# Patient Record
Sex: MALE | Race: WHITE | NOT HISPANIC OR LATINO | Employment: OTHER | URBAN - METROPOLITAN AREA
[De-identification: names, ages, dates, MRNs, and addresses within clinical notes are randomized per-mention and may not be internally consistent; named-entity substitution may affect disease eponyms.]

---

## 2021-11-28 ENCOUNTER — HOSPITAL ENCOUNTER (EMERGENCY)
Facility: HOSPITAL | Age: 66
Discharge: HOME | End: 2021-11-28
Attending: EMERGENCY MEDICINE
Payer: MEDICARE

## 2021-11-28 VITALS
TEMPERATURE: 98 F | RESPIRATION RATE: 18 BRPM | WEIGHT: 250 LBS | HEIGHT: 71 IN | SYSTOLIC BLOOD PRESSURE: 126 MMHG | DIASTOLIC BLOOD PRESSURE: 78 MMHG | HEART RATE: 74 BPM | BODY MASS INDEX: 35 KG/M2 | OXYGEN SATURATION: 98 %

## 2021-11-28 DIAGNOSIS — L03.115 CELLULITIS OF RIGHT LOWER EXTREMITY: ICD-10-CM

## 2021-11-28 DIAGNOSIS — I87.2 VENOUS STASIS DERMATITIS, UNSPECIFIED LATERALITY: Primary | ICD-10-CM

## 2021-11-28 LAB
ALBUMIN SERPL BCP-MCNC: 3.1 G/DL (ref 3.5–5.2)
ALP SERPL-CCNC: 55 U/L (ref 55–135)
ALT SERPL W/O P-5'-P-CCNC: 20 U/L (ref 10–44)
ANION GAP SERPL CALC-SCNC: 10 MMOL/L (ref 8–16)
AST SERPL-CCNC: 21 U/L (ref 10–40)
BASOPHILS # BLD AUTO: 0.01 K/UL (ref 0–0.2)
BASOPHILS NFR BLD: 0.2 % (ref 0–1.9)
BILIRUB SERPL-MCNC: 0.5 MG/DL (ref 0.1–1)
BUN SERPL-MCNC: 25 MG/DL (ref 8–23)
CALCIUM SERPL-MCNC: 8.5 MG/DL (ref 8.7–10.5)
CHLORIDE SERPL-SCNC: 105 MMOL/L (ref 95–110)
CO2 SERPL-SCNC: 26 MMOL/L (ref 23–29)
CREAT SERPL-MCNC: 1.4 MG/DL (ref 0.5–1.4)
DIFFERENTIAL METHOD: ABNORMAL
EOSINOPHIL # BLD AUTO: 0.1 K/UL (ref 0–0.5)
EOSINOPHIL NFR BLD: 1.1 % (ref 0–8)
ERYTHROCYTE [DISTWIDTH] IN BLOOD BY AUTOMATED COUNT: 12.9 % (ref 11.5–14.5)
EST. GFR  (AFRICAN AMERICAN): >60 ML/MIN/1.73 M^2
EST. GFR  (NON AFRICAN AMERICAN): 52 ML/MIN/1.73 M^2
GLUCOSE SERPL-MCNC: 149 MG/DL (ref 70–110)
HCT VFR BLD AUTO: 39.4 % (ref 40–54)
HGB BLD-MCNC: 12.7 G/DL (ref 14–18)
IMM GRANULOCYTES # BLD AUTO: 0.01 K/UL (ref 0–0.04)
IMM GRANULOCYTES NFR BLD AUTO: 0.2 % (ref 0–0.5)
LACTATE SERPL-SCNC: 1.4 MMOL/L (ref 0.5–2.2)
LYMPHOCYTES # BLD AUTO: 0.6 K/UL (ref 1–4.8)
LYMPHOCYTES NFR BLD: 11.9 % (ref 18–48)
MCH RBC QN AUTO: 29.8 PG (ref 27–31)
MCHC RBC AUTO-ENTMCNC: 32.2 G/DL (ref 32–36)
MCV RBC AUTO: 93 FL (ref 82–98)
MONOCYTES # BLD AUTO: 0.3 K/UL (ref 0.3–1)
MONOCYTES NFR BLD: 5.4 % (ref 4–15)
NEUTROPHILS # BLD AUTO: 4.4 K/UL (ref 1.8–7.7)
NEUTROPHILS NFR BLD: 81.2 % (ref 38–73)
NRBC BLD-RTO: 0 /100 WBC
PLATELET # BLD AUTO: 130 K/UL (ref 150–450)
PMV BLD AUTO: 9.6 FL (ref 9.2–12.9)
POTASSIUM SERPL-SCNC: 3.6 MMOL/L (ref 3.5–5.1)
PROT SERPL-MCNC: 6.5 G/DL (ref 6–8.4)
RBC # BLD AUTO: 4.26 M/UL (ref 4.6–6.2)
SODIUM SERPL-SCNC: 141 MMOL/L (ref 136–145)
WBC # BLD AUTO: 5.39 K/UL (ref 3.9–12.7)

## 2021-11-28 PROCEDURE — 87040 BLOOD CULTURE FOR BACTERIA: CPT | Mod: 59 | Performed by: EMERGENCY MEDICINE

## 2021-11-28 PROCEDURE — 25000003 PHARM REV CODE 250: Performed by: EMERGENCY MEDICINE

## 2021-11-28 PROCEDURE — 63600175 PHARM REV CODE 636 W HCPCS: Performed by: EMERGENCY MEDICINE

## 2021-11-28 PROCEDURE — 80053 COMPREHEN METABOLIC PANEL: CPT | Performed by: EMERGENCY MEDICINE

## 2021-11-28 PROCEDURE — 83605 ASSAY OF LACTIC ACID: CPT | Performed by: EMERGENCY MEDICINE

## 2021-11-28 PROCEDURE — 96365 THER/PROPH/DIAG IV INF INIT: CPT

## 2021-11-28 PROCEDURE — 99284 EMERGENCY DEPT VISIT MOD MDM: CPT | Mod: 25

## 2021-11-28 PROCEDURE — 85025 COMPLETE CBC W/AUTO DIFF WBC: CPT | Performed by: EMERGENCY MEDICINE

## 2021-11-28 RX ORDER — AMOXICILLIN AND CLAVULANATE POTASSIUM 875; 125 MG/1; MG/1
1 TABLET, FILM COATED ORAL 2 TIMES DAILY
Qty: 20 TABLET | Refills: 0 | Status: SHIPPED | OUTPATIENT
Start: 2021-11-28 | End: 2021-12-08

## 2021-11-28 RX ADMIN — PIPERACILLIN AND TAZOBACTAM 4.5 G: 4; .5 INJECTION, POWDER, LYOPHILIZED, FOR SOLUTION INTRAVENOUS; PARENTERAL at 11:11

## 2021-12-03 LAB
BACTERIA BLD CULT: NORMAL
BACTERIA BLD CULT: NORMAL

## 2022-01-19 ENCOUNTER — HOSPITAL ENCOUNTER (EMERGENCY)
Facility: HOSPITAL | Age: 67
Discharge: HOME | End: 2022-01-19
Attending: EMERGENCY MEDICINE
Payer: MEDICARE

## 2022-01-19 VITALS
SYSTOLIC BLOOD PRESSURE: 160 MMHG | TEMPERATURE: 100 F | WEIGHT: 232.25 LBS | BODY MASS INDEX: 32.52 KG/M2 | OXYGEN SATURATION: 97 % | HEIGHT: 71 IN | HEART RATE: 90 BPM | RESPIRATION RATE: 18 BRPM | DIASTOLIC BLOOD PRESSURE: 68 MMHG

## 2022-01-19 DIAGNOSIS — L03.115 CELLULITIS OF RIGHT LOWER LEG: Primary | ICD-10-CM

## 2022-01-19 LAB
ALBUMIN SERPL BCP-MCNC: 3.2 G/DL (ref 3.5–5.2)
ALP SERPL-CCNC: 64 U/L (ref 55–135)
ALT SERPL W/O P-5'-P-CCNC: 21 U/L (ref 10–44)
ANION GAP SERPL CALC-SCNC: 10 MMOL/L (ref 8–16)
AST SERPL-CCNC: 27 U/L (ref 10–40)
BACTERIA #/AREA URNS HPF: NORMAL /HPF
BASOPHILS # BLD AUTO: 0.02 K/UL (ref 0–0.2)
BASOPHILS NFR BLD: 0.3 % (ref 0–1.9)
BILIRUB SERPL-MCNC: 0.7 MG/DL (ref 0.1–1)
BILIRUB UR QL STRIP: NEGATIVE
BUN SERPL-MCNC: 28 MG/DL (ref 8–23)
CALCIUM SERPL-MCNC: 8.2 MG/DL (ref 8.7–10.5)
CHLORIDE SERPL-SCNC: 104 MMOL/L (ref 95–110)
CLARITY UR: CLEAR
CO2 SERPL-SCNC: 24 MMOL/L (ref 23–29)
COLOR UR: YELLOW
CREAT SERPL-MCNC: 1.8 MG/DL (ref 0.5–1.4)
CRP SERPL-MCNC: 338.2 MG/L (ref 0–8.2)
DIFFERENTIAL METHOD: ABNORMAL
EOSINOPHIL # BLD AUTO: 0 K/UL (ref 0–0.5)
EOSINOPHIL NFR BLD: 0.4 % (ref 0–8)
ERYTHROCYTE [DISTWIDTH] IN BLOOD BY AUTOMATED COUNT: 13.6 % (ref 11.5–14.5)
ERYTHROCYTE [SEDIMENTATION RATE] IN BLOOD BY WESTERGREN METHOD: 42 MM/HR (ref 0–10)
EST. GFR  (AFRICAN AMERICAN): 44 ML/MIN/1.73 M^2
EST. GFR  (NON AFRICAN AMERICAN): 38 ML/MIN/1.73 M^2
GLUCOSE SERPL-MCNC: 189 MG/DL (ref 70–110)
GLUCOSE UR QL STRIP: NEGATIVE
HCT VFR BLD AUTO: 39.6 % (ref 40–54)
HGB BLD-MCNC: 13 G/DL (ref 14–18)
HGB UR QL STRIP: NEGATIVE
HYALINE CASTS #/AREA URNS LPF: 0 /LPF
IMM GRANULOCYTES # BLD AUTO: 0.08 K/UL (ref 0–0.04)
IMM GRANULOCYTES NFR BLD AUTO: 1.2 % (ref 0–0.5)
KETONES UR QL STRIP: NEGATIVE
LACTATE SERPL-SCNC: 1.9 MMOL/L (ref 0.5–2.2)
LEUKOCYTE ESTERASE UR QL STRIP: NEGATIVE
LYMPHOCYTES # BLD AUTO: 0.2 K/UL (ref 1–4.8)
LYMPHOCYTES NFR BLD: 3.1 % (ref 18–48)
MCH RBC QN AUTO: 29.7 PG (ref 27–31)
MCHC RBC AUTO-ENTMCNC: 32.8 G/DL (ref 32–36)
MCV RBC AUTO: 91 FL (ref 82–98)
MICROSCOPIC COMMENT: NORMAL
MONOCYTES # BLD AUTO: 0.1 K/UL (ref 0.3–1)
MONOCYTES NFR BLD: 1.6 % (ref 4–15)
NEUTROPHILS # BLD AUTO: 6.2 K/UL (ref 1.8–7.7)
NEUTROPHILS NFR BLD: 93.4 % (ref 38–73)
NITRITE UR QL STRIP: NEGATIVE
NRBC BLD-RTO: 0 /100 WBC
PH UR STRIP: 6 [PH] (ref 5–8)
PLATELET # BLD AUTO: 94 K/UL (ref 150–450)
PMV BLD AUTO: 10.1 FL (ref 9.2–12.9)
POTASSIUM SERPL-SCNC: 3.6 MMOL/L (ref 3.5–5.1)
PROT SERPL-MCNC: 6.4 G/DL (ref 6–8.4)
PROT UR QL STRIP: ABNORMAL
RBC # BLD AUTO: 4.37 M/UL (ref 4.6–6.2)
RBC #/AREA URNS HPF: 0 /HPF (ref 0–4)
SARS-COV-2 RDRP RESP QL NAA+PROBE: NEGATIVE
SODIUM SERPL-SCNC: 138 MMOL/L (ref 136–145)
SP GR UR STRIP: >=1.03 (ref 1–1.03)
URN SPEC COLLECT METH UR: ABNORMAL
UROBILINOGEN UR STRIP-ACNC: 1 EU/DL
WBC # BLD AUTO: 6.67 K/UL (ref 3.9–12.7)
WBC #/AREA URNS HPF: 0 /HPF (ref 0–5)

## 2022-01-19 PROCEDURE — 86140 C-REACTIVE PROTEIN: CPT | Performed by: NURSE PRACTITIONER

## 2022-01-19 PROCEDURE — 83605 ASSAY OF LACTIC ACID: CPT | Performed by: NURSE PRACTITIONER

## 2022-01-19 PROCEDURE — 99283 EMERGENCY DEPT VISIT LOW MDM: CPT | Mod: 25

## 2022-01-19 PROCEDURE — 85651 RBC SED RATE NONAUTOMATED: CPT | Performed by: NURSE PRACTITIONER

## 2022-01-19 PROCEDURE — 87040 BLOOD CULTURE FOR BACTERIA: CPT | Mod: 59 | Performed by: NURSE PRACTITIONER

## 2022-01-19 PROCEDURE — 85025 COMPLETE CBC W/AUTO DIFF WBC: CPT | Performed by: NURSE PRACTITIONER

## 2022-01-19 PROCEDURE — U0002 COVID-19 LAB TEST NON-CDC: HCPCS | Performed by: NURSE PRACTITIONER

## 2022-01-19 PROCEDURE — 25000003 PHARM REV CODE 250: Performed by: EMERGENCY MEDICINE

## 2022-01-19 PROCEDURE — 81000 URINALYSIS NONAUTO W/SCOPE: CPT | Performed by: NURSE PRACTITIONER

## 2022-01-19 PROCEDURE — 80053 COMPREHEN METABOLIC PANEL: CPT | Performed by: NURSE PRACTITIONER

## 2022-01-19 RX ORDER — OMEPRAZOLE 40 MG/1
40 CAPSULE, DELAYED RELEASE ORAL DAILY
COMMUNITY
Start: 2021-09-29

## 2022-01-19 RX ORDER — IBUPROFEN 800 MG/1
800 TABLET ORAL
Status: COMPLETED | OUTPATIENT
Start: 2022-01-19 | End: 2022-01-19

## 2022-01-19 RX ORDER — TRAMADOL HYDROCHLORIDE 50 MG/1
50 TABLET ORAL EVERY 6 HOURS PRN
Qty: 12 TABLET | Refills: 0 | Status: SHIPPED | OUTPATIENT
Start: 2022-01-19 | End: 2022-01-23

## 2022-01-19 RX ORDER — ASPIRIN 81 MG/1
81 TABLET ORAL DAILY
COMMUNITY

## 2022-01-19 RX ORDER — ROSUVASTATIN CALCIUM 10 MG/1
10 TABLET, COATED ORAL NIGHTLY
COMMUNITY
Start: 2021-10-20

## 2022-01-19 RX ORDER — SULFAMETHOXAZOLE AND TRIMETHOPRIM 800; 160 MG/1; MG/1
1 TABLET ORAL 2 TIMES DAILY
Qty: 14 TABLET | Refills: 0 | Status: ON HOLD | OUTPATIENT
Start: 2022-01-19 | End: 2022-01-26 | Stop reason: HOSPADM

## 2022-01-19 RX ORDER — LOSARTAN POTASSIUM 100 MG/1
100 TABLET ORAL DAILY
COMMUNITY
Start: 2021-10-20

## 2022-01-19 RX ORDER — CLINDAMYCIN HYDROCHLORIDE 150 MG/1
300 CAPSULE ORAL
Status: COMPLETED | OUTPATIENT
Start: 2022-01-19 | End: 2022-01-19

## 2022-01-19 RX ORDER — AMLODIPINE BESYLATE 5 MG/1
5 TABLET ORAL DAILY
COMMUNITY
Start: 2021-10-20

## 2022-01-19 RX ORDER — HYDROCHLOROTHIAZIDE 25 MG/1
25 TABLET ORAL DAILY
COMMUNITY
Start: 2021-10-20

## 2022-01-19 RX ORDER — MELOXICAM 15 MG/1
15 TABLET ORAL DAILY
Status: ON HOLD | COMMUNITY
Start: 2021-10-20 | End: 2022-01-26 | Stop reason: HOSPADM

## 2022-01-19 RX ORDER — CLINDAMYCIN HYDROCHLORIDE 150 MG/1
300 CAPSULE ORAL 4 TIMES DAILY
Qty: 56 CAPSULE | Refills: 0 | Status: ON HOLD | OUTPATIENT
Start: 2022-01-19 | End: 2022-01-26 | Stop reason: HOSPADM

## 2022-01-19 RX ORDER — ACETAMINOPHEN 325 MG/1
650 TABLET ORAL
Status: DISCONTINUED | OUTPATIENT
Start: 2022-01-19 | End: 2022-01-19 | Stop reason: HOSPADM

## 2022-01-19 RX ORDER — SULFAMETHOXAZOLE AND TRIMETHOPRIM 800; 160 MG/1; MG/1
1 TABLET ORAL
Status: COMPLETED | OUTPATIENT
Start: 2022-01-19 | End: 2022-01-19

## 2022-01-19 RX ORDER — ASPIRIN 325 MG
1 TABLET, DELAYED RELEASE (ENTERIC COATED) ORAL
COMMUNITY
Start: 2022-01-04

## 2022-01-19 RX ORDER — FENOFIBRATE 160 MG/1
160 TABLET ORAL DAILY
COMMUNITY
Start: 2021-10-20

## 2022-01-19 RX ORDER — CARVEDILOL 25 MG/1
25 TABLET ORAL 2 TIMES DAILY
COMMUNITY
Start: 2021-10-20

## 2022-01-19 RX ORDER — POTASSIUM CHLORIDE 750 MG/1
10 CAPSULE, EXTENDED RELEASE ORAL DAILY
COMMUNITY
Start: 2021-10-20

## 2022-01-19 RX ADMIN — IBUPROFEN 800 MG: 800 TABLET, FILM COATED ORAL at 01:01

## 2022-01-19 RX ADMIN — CLINDAMYCIN HYDROCHLORIDE 300 MG: 150 CAPSULE ORAL at 01:01

## 2022-01-19 RX ADMIN — SULFAMETHOXAZOLE AND TRIMETHOPRIM 1 TABLET: 800; 160 TABLET ORAL at 01:01

## 2022-01-19 NOTE — DISCHARGE INSTRUCTIONS
Bactrim and clindamycin for infection.  Alternate Motrin Tylenol every 4 hours for fever and pain.  Ultram for breakthrough pain.  Follow up with her doctor 1-2 days for re-evaluation.  Return as needed if her redness spreads or for any worsening symptoms, problems, questions or concerns.

## 2022-01-19 NOTE — ED PROVIDER NOTES
SCRIBE #1 NOTE: I, Tommy James, am scribing for, and in the presence of, Rafiq Phan Jr., MD. I have scribed the entire note.      History      Chief Complaint   Patient presents with    Rt lower leg cellulitis     Redness and swelling to medial side of lower leg       Review of patient's allergies indicates:   Allergen Reactions    Prednisone         HPI   HPI    1/19/2022, 1:46 PM   History obtained from the patient      History of Present Illness: Lemuel Allen is a 66 y.o. male patient who presents to the Emergency Department for RLE pain, onset 1 week PTA. Pt states that he was dx with cellulitis 3 days ago and started on abx. Symptoms are constant and moderate in severity. No mitigating or exacerbating factors reported. Associated sxs include fever (Tnow 100.4) and RLE redness. Patient denies any chills, n/v/d, SOB, CP, weakness, numbness, dizziness, headache, and all other sxs at this time. No further complaints or concerns at this time.     Arrival mode: Personal vehicle    PCP: Michael Fitzpatrick MD       Past Medical History:  No past medical history on file.    Past Surgical History:  No past surgical history on file.      Family History:  No family history on file.    Social History:  Social History     Tobacco Use    Smoking status: Not on file    Smokeless tobacco: Not on file   Substance and Sexual Activity    Alcohol use: Not on file    Drug use: Not on file    Sexual activity: Not on file       ROS   Review of Systems   Constitutional: Positive for fever (Tnow 100.4). Negative for chills.   HENT: Negative for sore throat.    Respiratory: Negative for shortness of breath.    Cardiovascular: Negative for chest pain.   Gastrointestinal: Negative for diarrhea, nausea and vomiting.   Genitourinary: Negative for dysuria.   Musculoskeletal: Positive for myalgias (RLE). Negative for back pain.   Skin: Positive for color change (RLE redness). Negative for rash.   Neurological: Negative for dizziness,  "weakness, light-headedness, numbness and headaches.   Hematological: Does not bruise/bleed easily.   All other systems reviewed and are negative.    Physical Exam      Initial Vitals [01/19/22 1140]   BP Pulse Resp Temp SpO2   (!) 123/53 86 18 (!) 100.4 °F (38 °C) 97 %      MAP       --          Physical Exam  Nursing Notes and Vital Signs Reviewed.  Constitutional: Patient is in no acute distress. Well-developed and well-nourished.  Head: Atraumatic. Normocephalic.  Eyes:  EOM intact.  No scleral icterus.  ENT: Mucous membranes are moist.  Nares clear   Neck:  Full ROM. No JVD.  Cardiovascular: Regular rate. Regular rhythm No murmurs, rubs, or gallops. Distal pulses are 2+ and symmetric  Pulmonary/Chest: No respiratory distress. Clear to auscultation bilaterally. No wheezing or rales.  Equal chest wall rise bilaterally  Musculoskeletal: Moves all extremities. No obvious deformities.  5 x 5 strength in all extremities   Skin: Warm and dry.  Patient was cellulitis from the distal 3rd of the anterior lower leg extending proximally to just below the knee.  This covers the anterior leg or extensively laterally than medially.  It is not circumferential.  There is no abscess.  There is no fluctuance.  There is no crepitus.  There is no palpable cord her evidence of DVT.  Neurological:  Alert, awake, and appropriate.  Normal speech.  No acute focal neurological deficits are appreciated.  Two through 12 intact bilaterally.  Psychiatric: Normal affect. Good eye contact. Appropriate in content.    ED Course    Procedures  ED Vital Signs:  Vitals:    01/19/22 1140 01/19/22 1343   BP: (!) 123/53 (!) 174/75   Pulse: 86 90   Resp: 18 18   Temp: (!) 100.4 °F (38 °C) (!) 100.4 °F (38 °C)   TempSrc: Oral Oral   SpO2: 97%    Weight: 105.3 kg (232 lb 4.1 oz)    Height: 5' 11" (1.803 m)        Abnormal Lab Results:  Labs Reviewed   CBC W/ AUTO DIFFERENTIAL - Abnormal; Notable for the following components:       Result Value    RBC 4.37 " (*)     Hemoglobin 13.0 (*)     Hematocrit 39.6 (*)     Platelets 94 (*)     All other components within normal limits   COMPREHENSIVE METABOLIC PANEL - Abnormal; Notable for the following components:    Glucose 189 (*)     BUN 28 (*)     Creatinine 1.8 (*)     Calcium 8.2 (*)     Albumin 3.2 (*)     eGFR if  44 (*)     eGFR if non  38 (*)     All other components within normal limits   URINALYSIS, REFLEX TO URINE CULTURE - Abnormal; Notable for the following components:    Specific Gravity, UA >=1.030 (*)     Protein, UA 1+ (*)     All other components within normal limits    Narrative:     Specimen Source->Urine   C-REACTIVE PROTEIN - Abnormal; Notable for the following components:    .2 (*)     All other components within normal limits   CULTURE, BLOOD   CULTURE, BLOOD   LACTIC ACID, PLASMA   SARS-COV-2 RNA AMPLIFICATION, QUAL   URINALYSIS MICROSCOPIC    Narrative:     Specimen Source->Urine   LACTIC ACID, PLASMA   SEDIMENTATION RATE        All Lab Results:  Results for orders placed or performed during the hospital encounter of 01/19/22   CBC auto differential   Result Value Ref Range    WBC 6.67 3.90 - 12.70 K/uL    RBC 4.37 (L) 4.60 - 6.20 M/uL    Hemoglobin 13.0 (L) 14.0 - 18.0 g/dL    Hematocrit 39.6 (L) 40.0 - 54.0 %    MCV 91 82 - 98 fL    MCH 29.7 27.0 - 31.0 pg    MCHC 32.8 32.0 - 36.0 g/dL    RDW 13.6 11.5 - 14.5 %    Platelets 94 (L) 150 - 450 K/uL    MPV 10.1 9.2 - 12.9 fL   Comprehensive metabolic panel   Result Value Ref Range    Sodium 138 136 - 145 mmol/L    Potassium 3.6 3.5 - 5.1 mmol/L    Chloride 104 95 - 110 mmol/L    CO2 24 23 - 29 mmol/L    Glucose 189 (H) 70 - 110 mg/dL    BUN 28 (H) 8 - 23 mg/dL    Creatinine 1.8 (H) 0.5 - 1.4 mg/dL    Calcium 8.2 (L) 8.7 - 10.5 mg/dL    Total Protein 6.4 6.0 - 8.4 g/dL    Albumin 3.2 (L) 3.5 - 5.2 g/dL    Total Bilirubin 0.7 0.1 - 1.0 mg/dL    Alkaline Phosphatase 64 55 - 135 U/L    AST 27 10 - 40 U/L    ALT 21 10 -  44 U/L    Anion Gap 10 8 - 16 mmol/L    eGFR if African American 44 (A) >60 mL/min/1.73 m^2    eGFR if non African American 38 (A) >60 mL/min/1.73 m^2   Lactic acid, plasma #1   Result Value Ref Range    Lactate (Lactic Acid) 1.9 0.5 - 2.2 mmol/L   Urinalysis, Reflex to Urine Culture Urine, Clean Catch    Specimen: Urine   Result Value Ref Range    Specimen UA Urine, Clean Catch     Color, UA Yellow Yellow, Straw, Zuly    Appearance, UA Clear Clear    pH, UA 6.0 5.0 - 8.0    Specific Gravity, UA >=1.030 (A) 1.005 - 1.030    Protein, UA 1+ (A) Negative    Glucose, UA Negative Negative    Ketones, UA Negative Negative    Bilirubin (UA) Negative Negative    Occult Blood UA Negative Negative    Nitrite, UA Negative Negative    Urobilinogen, UA 1.0 <2.0 EU/dL    Leukocytes, UA Negative Negative   C-reactive protein   Result Value Ref Range    .2 (H) 0.0 - 8.2 mg/L   COVID-19 Rapid Screening   Result Value Ref Range    SARS-CoV-2 RNA, Amplification, Qual Negative Negative   Urinalysis Microscopic   Result Value Ref Range    RBC, UA 0 0 - 4 /hpf    WBC, UA 0 0 - 5 /hpf    Bacteria None None-Occ /hpf    Hyaline Casts, UA 0 0-1/lpf /lpf    Microscopic Comment SEE COMMENT      Imaging Results:  Imaging Results          X-Ray Chest AP Portable (Final result)  Result time 01/19/22 13:08:38    Final result by JORJE Lujan Sr., MD (01/19/22 13:08:38)                 Impression:      There is no evidence of an acute pulmonary process.  .      Electronically signed by: Juno Lujan MD  Date:    01/19/2022  Time:    13:08             Narrative:    EXAMINATION:  XR CHEST AP PORTABLE    CLINICAL HISTORY:  Sepsis;    COMPARISON:  04/13/2011    FINDINGS:  The size of the heart is normal.  There is no evidence of an acute pulmonary process.  There is no pneumothorax.  The costophrenic angles are sharp.                                        The Emergency Provider reviewed the vital signs and test results, which are  outlined above.    ED Discussion     1:49 PM: Reassessed pt at this time. Discussed with pt all pertinent ED information and results. Discussed pt dx and plan of tx. Gave pt all f/u and return to the ED instructions. All questions and concerns were addressed at this time. Pt expresses understanding of information and instructions, and is comfortable with plan to discharge. Pt is stable for discharge.    I discussed with patient and/or family/caretaker that evaluation in the ED does not suggest any emergent or life threatening medical conditions requiring immediate intervention beyond what was provided in the ED, and I believe patient is safe for discharge.  Regardless, an unremarkable evaluation in the ED does not preclude the development or presence of a serious of life threatening condition. As such, patient was instructed to return immediately for any worsening or change in current symptoms.       Patient is stable nontoxic.  Has low-grade temperature with normal white count.  He has a history of cellulitis in the leg which responds very well oral antibiotics and he is comfortable treating this at home.  He is not toxic and is not circumferential.  There is no abscess to obtain source control.  Patient does have good follow-up.  Discussed with him at length his diagnosis and will double cover staph with clinda and Bactrim due to his fever.  I have also provided pain control I have advised close follow-up tomorrow or the following day with primary care provider.  He is return if his redness worsens as fever worsens or for any problems questions or concerns.  He is very reliable and verbalized agreement understanding with all instructions    ED Medication(s):  Medications   acetaminophen tablet 650 mg (has no administration in time range)   clindamycin capsule 300 mg (300 mg Oral Given 1/19/22 3019)   sulfamethoxazole-trimethoprim 800-160mg per tablet 1 tablet (1 tablet Oral Given 1/19/22 7275)   ibuprofen tablet  800 mg (800 mg Oral Given 1/19/22 0516)     New Prescriptions    CLINDAMYCIN (CLEOCIN) 150 MG CAPSULE    Take 2 capsules (300 mg total) by mouth 4 (four) times daily. for 7 days    SULFAMETHOXAZOLE-TRIMETHOPRIM 800-160MG (BACTRIM DS) 800-160 MG TAB    Take 1 tablet by mouth 2 (two) times daily. for 7 days    TRAMADOL (ULTRAM) 50 MG TABLET    Take 1 tablet (50 mg total) by mouth every 6 (six) hours as needed for Pain.           Medical Decision Making    Medical Decision Making:   Clinical Tests:   Lab Tests: Ordered and Reviewed  Radiological Study: Ordered and Reviewed           Scribe Attestation:   Scribe #1: I performed the above scribed service and the documentation accurately describes the services I performed. I attest to the accuracy of the note.    Attending:   Physician Attestation Statement for Scribe #1: I, Rafiq Phan Jr., MD, personally performed the services described in this documentation, as scribed by Tommy Ramirez, in my presence, and it is both accurate and complete.          Clinical Impression       ICD-10-CM ICD-9-CM   1. Cellulitis of right lower leg  L03.115 682.6       Disposition:   Disposition: Discharged  Condition: Stable         Rafiq Phan Jr., MD  01/19/22 1401

## 2022-01-19 NOTE — FIRST PROVIDER EVALUATION
"Medical screening exam completed.  I have conducted a focused provider triage encounter, findings are as follows:    Brief history of present illness:  Cellulitis to lower ext. Hx of treatment with iv antibiotics     Vitals:    01/19/22 1140   BP: (!) 123/53   BP Location: Right arm   Patient Position: Sitting   Pulse: 86   Resp: 18   Temp: (!) 100.4 °F (38 °C)   TempSrc: Oral   SpO2: 97%   Weight: 105.3 kg (232 lb 4.1 oz)   Height: 5' 11" (1.803 m)         Preliminary workup initiated; this workup will be continued and followed by the physician or advanced practice provider that is assigned to the patient when roomed.  "

## 2022-01-23 ENCOUNTER — HOSPITAL ENCOUNTER (INPATIENT)
Facility: HOSPITAL | Age: 67
LOS: 3 days | Discharge: HOME HEALTH | DRG: 683 | End: 2022-01-26
Attending: EMERGENCY MEDICINE | Admitting: INTERNAL MEDICINE
Payer: MEDICARE

## 2022-01-23 DIAGNOSIS — N18.9 ACUTE RENAL FAILURE SUPERIMPOSED ON CHRONIC KIDNEY DISEASE, UNSPECIFIED CKD STAGE, UNSPECIFIED ACUTE RENAL FAILURE TYPE: ICD-10-CM

## 2022-01-23 DIAGNOSIS — R07.9 CHEST PAIN: ICD-10-CM

## 2022-01-23 DIAGNOSIS — R06.02 SHORTNESS OF BREATH: ICD-10-CM

## 2022-01-23 DIAGNOSIS — L03.115 CELLULITIS OF RIGHT LOWER EXTREMITY: Primary | ICD-10-CM

## 2022-01-23 DIAGNOSIS — R60.9 SWELLING: ICD-10-CM

## 2022-01-23 DIAGNOSIS — N17.9 ACUTE RENAL FAILURE SUPERIMPOSED ON CHRONIC KIDNEY DISEASE, UNSPECIFIED CKD STAGE, UNSPECIFIED ACUTE RENAL FAILURE TYPE: ICD-10-CM

## 2022-01-23 PROBLEM — I10 HYPERTENSION: Status: ACTIVE | Noted: 2022-01-23

## 2022-01-23 PROBLEM — E78.5 HYPERLIPIDEMIA: Status: ACTIVE | Noted: 2022-01-23

## 2022-01-23 LAB
ALBUMIN SERPL BCP-MCNC: 2.5 G/DL (ref 3.5–5.2)
ALP SERPL-CCNC: 220 U/L (ref 55–135)
ALT SERPL W/O P-5'-P-CCNC: 33 U/L (ref 10–44)
ANION GAP SERPL CALC-SCNC: 12 MMOL/L (ref 8–16)
ANION GAP SERPL CALC-SCNC: 12 MMOL/L (ref 8–16)
AORTIC ROOT ANNULUS: 3.93 CM
ASCENDING AORTA: 3.34 CM
AST SERPL-CCNC: 47 U/L (ref 10–40)
AV INDEX (PROSTH): 0.71
AV MEAN GRADIENT: 5 MMHG
AV PEAK GRADIENT: 8 MMHG
AV VALVE AREA: 2.93 CM2
AV VELOCITY RATIO: 0.67
BASOPHILS # BLD AUTO: 0.02 K/UL (ref 0–0.2)
BASOPHILS NFR BLD: 0.2 % (ref 0–1.9)
BILIRUB SERPL-MCNC: 0.5 MG/DL (ref 0.1–1)
BILIRUB UR QL STRIP: NEGATIVE
BNP SERPL-MCNC: 527 PG/ML (ref 0–99)
BSA FOR ECHO PROCEDURE: 2.33 M2
BUN SERPL-MCNC: 64 MG/DL (ref 8–23)
BUN SERPL-MCNC: 72 MG/DL (ref 8–23)
CALCIUM SERPL-MCNC: 7.9 MG/DL (ref 8.7–10.5)
CALCIUM SERPL-MCNC: 8.8 MG/DL (ref 8.7–10.5)
CHLORIDE SERPL-SCNC: 100 MMOL/L (ref 95–110)
CHLORIDE SERPL-SCNC: 105 MMOL/L (ref 95–110)
CLARITY UR: CLEAR
CO2 SERPL-SCNC: 20 MMOL/L (ref 23–29)
CO2 SERPL-SCNC: 22 MMOL/L (ref 23–29)
COLOR UR: YELLOW
CREAT SERPL-MCNC: 2.8 MG/DL (ref 0.5–1.4)
CREAT SERPL-MCNC: 3.4 MG/DL (ref 0.5–1.4)
CRP SERPL-MCNC: 259.7 MG/L (ref 0–8.2)
CV ECHO LV RWT: 1.23 CM
DIFFERENTIAL METHOD: ABNORMAL
DOP CALC AO PEAK VEL: 1.41 M/S
DOP CALC AO VTI: 29.2 CM
DOP CALC LVOT AREA: 4.1 CM2
DOP CALC LVOT DIAMETER: 2.29 CM
DOP CALC LVOT PEAK VEL: 0.95 M/S
DOP CALC LVOT STROKE VOLUME: 85.63 CM3
DOP CALC RVOT PEAK VEL: 1.01 M/S
DOP CALC RVOT VTI: 25.2 CM
DOP CALCLVOT PEAK VEL VTI: 20.8 CM
E WAVE DECELERATION TIME: 187.58 MSEC
E/A RATIO: 1.89
E/E' RATIO: 7.25 M/S
ECHO EF ESTIMATED: 57 %
ECHO LV POSTERIOR WALL: 1.91 CM (ref 0.6–1.1)
EJECTION FRACTION: 65 %
EOSINOPHIL # BLD AUTO: 0.2 K/UL (ref 0–0.5)
EOSINOPHIL NFR BLD: 1.8 % (ref 0–8)
ERYTHROCYTE [DISTWIDTH] IN BLOOD BY AUTOMATED COUNT: 14.8 % (ref 11.5–14.5)
ERYTHROCYTE [SEDIMENTATION RATE] IN BLOOD BY WESTERGREN METHOD: 117 MM/HR (ref 0–10)
EST. GFR  (AFRICAN AMERICAN): 21 ML/MIN/1.73 M^2
EST. GFR  (AFRICAN AMERICAN): 26 ML/MIN/1.73 M^2
EST. GFR  (NON AFRICAN AMERICAN): 18 ML/MIN/1.73 M^2
EST. GFR  (NON AFRICAN AMERICAN): 22 ML/MIN/1.73 M^2
FERRITIN SERPL-MCNC: 815 NG/ML (ref 20–300)
FRACTIONAL SHORTENING: 29 % (ref 28–44)
GLUCOSE SERPL-MCNC: 104 MG/DL (ref 70–110)
GLUCOSE SERPL-MCNC: 108 MG/DL (ref 70–110)
GLUCOSE UR QL STRIP: NEGATIVE
HCT VFR BLD AUTO: 31.5 % (ref 40–54)
HGB BLD-MCNC: 10.2 G/DL (ref 14–18)
HGB UR QL STRIP: NEGATIVE
IMM GRANULOCYTES # BLD AUTO: 0.13 K/UL (ref 0–0.04)
IMM GRANULOCYTES NFR BLD AUTO: 1.5 % (ref 0–0.5)
INTERVENTRICULAR SEPTUM: 1.91 CM (ref 0.6–1.1)
IVC DIAMETER: 2.18 CM
IVRT: 34.25 MSEC
KETONES UR QL STRIP: NEGATIVE
LA MAJOR: 4.66 CM
LA MINOR: 3.41 CM
LA WIDTH: 3.44 CM
LACTATE SERPL-SCNC: 0.7 MMOL/L (ref 0.5–2.2)
LDH SERPL L TO P-CCNC: 349 U/L (ref 110–260)
LEFT ATRIUM SIZE: 3 CM
LEFT ATRIUM VOLUME INDEX: 15.2 ML/M2
LEFT ATRIUM VOLUME: 34.55 CM3
LEFT INTERNAL DIMENSION IN SYSTOLE: 2.21 CM (ref 2.1–4)
LEFT VENTRICLE DIASTOLIC VOLUME INDEX: 16.88 ML/M2
LEFT VENTRICLE DIASTOLIC VOLUME: 38.32 ML
LEFT VENTRICLE MASS INDEX: 111 G/M2
LEFT VENTRICLE SYSTOLIC VOLUME INDEX: 7.2 ML/M2
LEFT VENTRICLE SYSTOLIC VOLUME: 16.32 ML
LEFT VENTRICULAR INTERNAL DIMENSION IN DIASTOLE: 3.11 CM (ref 3.5–6)
LEFT VENTRICULAR MASS: 252.47 G
LEUKOCYTE ESTERASE UR QL STRIP: NEGATIVE
LV LATERAL E/E' RATIO: 7.25 M/S
LV SEPTAL E/E' RATIO: 7.25 M/S
LVOT MG: 2.15 MMHG
LVOT MV: 0.71 CM/S
LYMPHOCYTES # BLD AUTO: 0.5 K/UL (ref 1–4.8)
LYMPHOCYTES NFR BLD: 6 % (ref 18–48)
MCH RBC QN AUTO: 29.4 PG (ref 27–31)
MCHC RBC AUTO-ENTMCNC: 32.4 G/DL (ref 32–36)
MCV RBC AUTO: 91 FL (ref 82–98)
MONOCYTES # BLD AUTO: 0.3 K/UL (ref 0.3–1)
MONOCYTES NFR BLD: 3.8 % (ref 4–15)
MV PEAK A VEL: 0.46 M/S
MV PEAK E VEL: 0.87 M/S
MV STENOSIS PRESSURE HALF TIME: 54.4 MS
MV VALVE AREA P 1/2 METHOD: 4.04 CM2
NEUTROPHILS # BLD AUTO: 7.5 K/UL (ref 1.8–7.7)
NEUTROPHILS NFR BLD: 86.7 % (ref 38–73)
NITRITE UR QL STRIP: NEGATIVE
NRBC BLD-RTO: 0 /100 WBC
PH UR STRIP: 6 [PH] (ref 5–8)
PISA TR MAX VEL: 3.41 M/S
PLATELET # BLD AUTO: 140 K/UL (ref 150–450)
PLATELET BLD QL SMEAR: ABNORMAL
PMV BLD AUTO: 10.7 FL (ref 9.2–12.9)
POTASSIUM SERPL-SCNC: 3.8 MMOL/L (ref 3.5–5.1)
POTASSIUM SERPL-SCNC: 4.3 MMOL/L (ref 3.5–5.1)
PROCALCITONIN SERPL IA-MCNC: 1 NG/ML
PROT SERPL-MCNC: 6.8 G/DL (ref 6–8.4)
PROT UR QL STRIP: NEGATIVE
PV MEAN GRADIENT: 2.99 MMHG
RA MAJOR: 4.19 CM
RA WIDTH: 3.51 CM
RBC # BLD AUTO: 3.47 M/UL (ref 4.6–6.2)
SARS-COV-2 RDRP RESP QL NAA+PROBE: NEGATIVE
SINUS: 3.46 CM
SODIUM SERPL-SCNC: 134 MMOL/L (ref 136–145)
SODIUM SERPL-SCNC: 137 MMOL/L (ref 136–145)
SODIUM UR-SCNC: 21 MMOL/L (ref 20–250)
SP GR UR STRIP: 1.02 (ref 1–1.03)
STJ: 3.42 CM
TDI LATERAL: 0.12 M/S
TDI SEPTAL: 0.12 M/S
TDI: 0.12 M/S
TR MAX PG: 47 MMHG
TRICUSPID ANNULAR PLANE SYSTOLIC EXCURSION: 2.27 CM
TROPONIN I SERPL DL<=0.01 NG/ML-MCNC: 0.01 NG/ML (ref 0–0.03)
URN SPEC COLLECT METH UR: NORMAL
UROBILINOGEN UR STRIP-ACNC: NEGATIVE EU/DL
WBC # BLD AUTO: 8.7 K/UL (ref 3.9–12.7)

## 2022-01-23 PROCEDURE — 63600175 PHARM REV CODE 636 W HCPCS: Performed by: NURSE PRACTITIONER

## 2022-01-23 PROCEDURE — 93005 ELECTROCARDIOGRAM TRACING: CPT

## 2022-01-23 PROCEDURE — 82728 ASSAY OF FERRITIN: CPT | Performed by: EMERGENCY MEDICINE

## 2022-01-23 PROCEDURE — 36415 COLL VENOUS BLD VENIPUNCTURE: CPT | Performed by: EMERGENCY MEDICINE

## 2022-01-23 PROCEDURE — 93010 ELECTROCARDIOGRAM REPORT: CPT | Mod: ,,, | Performed by: STUDENT IN AN ORGANIZED HEALTH CARE EDUCATION/TRAINING PROGRAM

## 2022-01-23 PROCEDURE — 85025 COMPLETE CBC W/AUTO DIFF WBC: CPT | Performed by: EMERGENCY MEDICINE

## 2022-01-23 PROCEDURE — 25000003 PHARM REV CODE 250: Performed by: INTERNAL MEDICINE

## 2022-01-23 PROCEDURE — 83605 ASSAY OF LACTIC ACID: CPT | Performed by: EMERGENCY MEDICINE

## 2022-01-23 PROCEDURE — 20000000 HC ICU ROOM

## 2022-01-23 PROCEDURE — 86140 C-REACTIVE PROTEIN: CPT | Performed by: EMERGENCY MEDICINE

## 2022-01-23 PROCEDURE — 99900035 HC TECH TIME PER 15 MIN (STAT)

## 2022-01-23 PROCEDURE — 25000003 PHARM REV CODE 250: Performed by: NURSE PRACTITIONER

## 2022-01-23 PROCEDURE — 84145 PROCALCITONIN (PCT): CPT | Performed by: EMERGENCY MEDICINE

## 2022-01-23 PROCEDURE — U0002 COVID-19 LAB TEST NON-CDC: HCPCS | Performed by: EMERGENCY MEDICINE

## 2022-01-23 PROCEDURE — 27000207 HC ISOLATION

## 2022-01-23 PROCEDURE — U0003 INFECTIOUS AGENT DETECTION BY NUCLEIC ACID (DNA OR RNA); SEVERE ACUTE RESPIRATORY SYNDROME CORONAVIRUS 2 (SARS-COV-2) (CORONAVIRUS DISEASE [COVID-19]), AMPLIFIED PROBE TECHNIQUE, MAKING USE OF HIGH THROUGHPUT TECHNOLOGIES AS DESCRIBED BY CMS-2020-01-R: HCPCS | Performed by: NURSE PRACTITIONER

## 2022-01-23 PROCEDURE — 80048 BASIC METABOLIC PNL TOTAL CA: CPT | Performed by: NURSE PRACTITIONER

## 2022-01-23 PROCEDURE — 96375 TX/PRO/DX INJ NEW DRUG ADDON: CPT

## 2022-01-23 PROCEDURE — 81003 URINALYSIS AUTO W/O SCOPE: CPT | Performed by: NURSE PRACTITIONER

## 2022-01-23 PROCEDURE — 83615 LACTATE (LD) (LDH) ENZYME: CPT | Performed by: EMERGENCY MEDICINE

## 2022-01-23 PROCEDURE — 83880 ASSAY OF NATRIURETIC PEPTIDE: CPT | Performed by: EMERGENCY MEDICINE

## 2022-01-23 PROCEDURE — 99291 CRITICAL CARE FIRST HOUR: CPT | Mod: 25

## 2022-01-23 PROCEDURE — 25000242 PHARM REV CODE 250 ALT 637 W/ HCPCS: Performed by: INTERNAL MEDICINE

## 2022-01-23 PROCEDURE — 63600175 PHARM REV CODE 636 W HCPCS: Performed by: EMERGENCY MEDICINE

## 2022-01-23 PROCEDURE — 25000003 PHARM REV CODE 250: Performed by: EMERGENCY MEDICINE

## 2022-01-23 PROCEDURE — 85651 RBC SED RATE NONAUTOMATED: CPT | Performed by: EMERGENCY MEDICINE

## 2022-01-23 PROCEDURE — 96365 THER/PROPH/DIAG IV INF INIT: CPT

## 2022-01-23 PROCEDURE — 84484 ASSAY OF TROPONIN QUANT: CPT | Performed by: EMERGENCY MEDICINE

## 2022-01-23 PROCEDURE — U0005 INFEC AGEN DETEC AMPLI PROBE: HCPCS | Performed by: NURSE PRACTITIONER

## 2022-01-23 PROCEDURE — 93010 EKG 12-LEAD: ICD-10-PCS | Mod: ,,, | Performed by: STUDENT IN AN ORGANIZED HEALTH CARE EDUCATION/TRAINING PROGRAM

## 2022-01-23 PROCEDURE — 80053 COMPREHEN METABOLIC PANEL: CPT | Performed by: EMERGENCY MEDICINE

## 2022-01-23 PROCEDURE — 84300 ASSAY OF URINE SODIUM: CPT | Performed by: NURSE PRACTITIONER

## 2022-01-23 PROCEDURE — 87040 BLOOD CULTURE FOR BACTERIA: CPT | Mod: 59 | Performed by: EMERGENCY MEDICINE

## 2022-01-23 RX ORDER — HEPARIN SODIUM 5000 [USP'U]/ML
5000 INJECTION, SOLUTION INTRAVENOUS; SUBCUTANEOUS EVERY 8 HOURS
Status: DISCONTINUED | OUTPATIENT
Start: 2022-01-23 | End: 2022-01-26 | Stop reason: HOSPADM

## 2022-01-23 RX ORDER — OLMESARTAN MEDOXOMIL 20 MG/1
2 TABLET ORAL DAILY
COMMUNITY
Start: 2021-12-02 | End: 2022-01-23 | Stop reason: SDUPTHER

## 2022-01-23 RX ORDER — ONDANSETRON 2 MG/ML
4 INJECTION INTRAMUSCULAR; INTRAVENOUS
Status: COMPLETED | OUTPATIENT
Start: 2022-01-23 | End: 2022-01-23

## 2022-01-23 RX ORDER — AMLODIPINE BESYLATE 5 MG/1
5 TABLET ORAL DAILY
Status: DISCONTINUED | OUTPATIENT
Start: 2022-01-23 | End: 2022-01-26 | Stop reason: HOSPADM

## 2022-01-23 RX ORDER — CEFEPIME HYDROCHLORIDE 1 G/50ML
1 INJECTION, SOLUTION INTRAVENOUS
Status: DISCONTINUED | OUTPATIENT
Start: 2022-01-23 | End: 2022-01-23

## 2022-01-23 RX ORDER — ONDANSETRON 2 MG/ML
4 INJECTION INTRAMUSCULAR; INTRAVENOUS EVERY 8 HOURS PRN
Status: DISCONTINUED | OUTPATIENT
Start: 2022-01-23 | End: 2022-01-26 | Stop reason: HOSPADM

## 2022-01-23 RX ORDER — AMOXICILLIN 250 MG
1 CAPSULE ORAL 2 TIMES DAILY
Status: DISCONTINUED | OUTPATIENT
Start: 2022-01-23 | End: 2022-01-26 | Stop reason: HOSPADM

## 2022-01-23 RX ORDER — ALBUTEROL SULFATE 0.83 MG/ML
2.5 SOLUTION RESPIRATORY (INHALATION) EVERY 6 HOURS
Status: DISCONTINUED | OUTPATIENT
Start: 2022-01-23 | End: 2022-01-26 | Stop reason: HOSPADM

## 2022-01-23 RX ORDER — SODIUM CHLORIDE 9 MG/ML
INJECTION, SOLUTION INTRAVENOUS
Status: DISCONTINUED | OUTPATIENT
Start: 2022-01-23 | End: 2022-01-26 | Stop reason: HOSPADM

## 2022-01-23 RX ORDER — CARVEDILOL 12.5 MG/1
25 TABLET ORAL 2 TIMES DAILY
Status: DISCONTINUED | OUTPATIENT
Start: 2022-01-23 | End: 2022-01-26 | Stop reason: HOSPADM

## 2022-01-23 RX ORDER — ATORVASTATIN CALCIUM 40 MG/1
40 TABLET, FILM COATED ORAL NIGHTLY
Status: DISCONTINUED | OUTPATIENT
Start: 2022-01-23 | End: 2022-01-26 | Stop reason: HOSPADM

## 2022-01-23 RX ORDER — ALBUTEROL SULFATE 90 UG/1
2 AEROSOL, METERED RESPIRATORY (INHALATION) EVERY 8 HOURS
Status: DISCONTINUED | OUTPATIENT
Start: 2022-01-23 | End: 2022-01-23

## 2022-01-23 RX ORDER — DEXTROSE MONOHYDRATE 50 MG/ML
INJECTION, SOLUTION INTRAVENOUS
Status: DISCONTINUED | OUTPATIENT
Start: 2022-01-23 | End: 2022-01-26 | Stop reason: HOSPADM

## 2022-01-23 RX ORDER — TALC
6 POWDER (GRAM) TOPICAL NIGHTLY PRN
Status: DISCONTINUED | OUTPATIENT
Start: 2022-01-23 | End: 2022-01-26 | Stop reason: HOSPADM

## 2022-01-23 RX ORDER — PANTOPRAZOLE SODIUM 40 MG/1
40 TABLET, DELAYED RELEASE ORAL DAILY
Status: DISCONTINUED | OUTPATIENT
Start: 2022-01-23 | End: 2022-01-26 | Stop reason: HOSPADM

## 2022-01-23 RX ORDER — SIMETHICONE 80 MG
1 TABLET,CHEWABLE ORAL 4 TIMES DAILY PRN
Status: DISCONTINUED | OUTPATIENT
Start: 2022-01-23 | End: 2022-01-26 | Stop reason: HOSPADM

## 2022-01-23 RX ORDER — DOXYCYCLINE HYCLATE 100 MG
100 TABLET ORAL EVERY 12 HOURS
Status: DISCONTINUED | OUTPATIENT
Start: 2022-01-23 | End: 2022-01-23

## 2022-01-23 RX ORDER — MAG HYDROX/ALUMINUM HYD/SIMETH 200-200-20
30 SUSPENSION, ORAL (FINAL DOSE FORM) ORAL 4 TIMES DAILY PRN
Status: DISCONTINUED | OUTPATIENT
Start: 2022-01-23 | End: 2022-01-26 | Stop reason: HOSPADM

## 2022-01-23 RX ORDER — FENOFIBRATE 160 MG/1
160 TABLET ORAL DAILY
Status: DISCONTINUED | OUTPATIENT
Start: 2022-01-23 | End: 2022-01-26 | Stop reason: HOSPADM

## 2022-01-23 RX ORDER — SODIUM CHLORIDE 0.9 % (FLUSH) 0.9 %
10 SYRINGE (ML) INJECTION EVERY 8 HOURS PRN
Status: DISCONTINUED | OUTPATIENT
Start: 2022-01-23 | End: 2022-01-26 | Stop reason: HOSPADM

## 2022-01-23 RX ORDER — ASPIRIN 81 MG/1
81 TABLET ORAL DAILY
Status: DISCONTINUED | OUTPATIENT
Start: 2022-01-23 | End: 2022-01-26 | Stop reason: HOSPADM

## 2022-01-23 RX ORDER — MORPHINE SULFATE 4 MG/ML
4 INJECTION, SOLUTION INTRAMUSCULAR; INTRAVENOUS
Status: COMPLETED | OUTPATIENT
Start: 2022-01-23 | End: 2022-01-23

## 2022-01-23 RX ORDER — ALBUTEROL SULFATE 90 UG/1
2 AEROSOL, METERED RESPIRATORY (INHALATION) EVERY 6 HOURS
Status: DISCONTINUED | OUTPATIENT
Start: 2022-01-23 | End: 2022-01-23 | Stop reason: CLARIF

## 2022-01-23 RX ORDER — CEFEPIME HYDROCHLORIDE 1 G/50ML
1 INJECTION, SOLUTION INTRAVENOUS
Status: DISCONTINUED | OUTPATIENT
Start: 2022-01-23 | End: 2022-01-24

## 2022-01-23 RX ORDER — ACETAMINOPHEN 325 MG/1
650 TABLET ORAL EVERY 4 HOURS PRN
Status: DISCONTINUED | OUTPATIENT
Start: 2022-01-23 | End: 2022-01-26 | Stop reason: HOSPADM

## 2022-01-23 RX ORDER — IPRATROPIUM BROMIDE AND ALBUTEROL SULFATE 2.5; .5 MG/3ML; MG/3ML
3 SOLUTION RESPIRATORY (INHALATION) EVERY 4 HOURS PRN
Status: DISCONTINUED | OUTPATIENT
Start: 2022-01-23 | End: 2022-01-26 | Stop reason: HOSPADM

## 2022-01-23 RX ADMIN — PANTOPRAZOLE SODIUM 40 MG: 40 TABLET, DELAYED RELEASE ORAL at 03:01

## 2022-01-23 RX ADMIN — CEFEPIME HYDROCHLORIDE 1 G: 1 INJECTION, SOLUTION INTRAVENOUS at 02:01

## 2022-01-23 RX ADMIN — SENNOSIDES AND DOCUSATE SODIUM 1 TABLET: 50; 8.6 TABLET ORAL at 09:01

## 2022-01-23 RX ADMIN — HEPARIN SODIUM 5000 UNITS: 5000 INJECTION INTRAVENOUS; SUBCUTANEOUS at 09:01

## 2022-01-23 RX ADMIN — VANCOMYCIN HYDROCHLORIDE 2000 MG: 10 INJECTION, POWDER, LYOPHILIZED, FOR SOLUTION INTRAVENOUS at 02:01

## 2022-01-23 RX ADMIN — SODIUM CHLORIDE 1000 ML: 0.9 INJECTION, SOLUTION INTRAVENOUS at 11:01

## 2022-01-23 RX ADMIN — ONDANSETRON 4 MG: 2 INJECTION INTRAMUSCULAR; INTRAVENOUS at 08:01

## 2022-01-23 RX ADMIN — MORPHINE SULFATE 4 MG: 4 INJECTION INTRAVENOUS at 08:01

## 2022-01-23 RX ADMIN — SODIUM CHLORIDE 1000 ML: 0.9 INJECTION, SOLUTION INTRAVENOUS at 09:01

## 2022-01-23 RX ADMIN — SODIUM CHLORIDE: 0.9 INJECTION, SOLUTION INTRAVENOUS at 11:01

## 2022-01-23 RX ADMIN — ALBUTEROL SULFATE 2.5 MG: 2.5 SOLUTION RESPIRATORY (INHALATION) at 07:01

## 2022-01-23 RX ADMIN — DEXTROSE: 50 INJECTION, SOLUTION INTRAVENOUS at 09:01

## 2022-01-23 RX ADMIN — ASPIRIN 81 MG: 81 TABLET, COATED ORAL at 03:01

## 2022-01-23 RX ADMIN — HEPARIN SODIUM 5000 UNITS: 5000 INJECTION INTRAVENOUS; SUBCUTANEOUS at 02:01

## 2022-01-23 RX ADMIN — ACETAMINOPHEN 650 MG: 325 TABLET ORAL at 09:01

## 2022-01-23 RX ADMIN — AMLODIPINE BESYLATE 5 MG: 5 TABLET ORAL at 03:01

## 2022-01-23 RX ADMIN — CARVEDILOL 25 MG: 12.5 TABLET, FILM COATED ORAL at 09:01

## 2022-01-23 RX ADMIN — DOXYCYCLINE 100 MG: 100 INJECTION, POWDER, LYOPHILIZED, FOR SOLUTION INTRAVENOUS at 09:01

## 2022-01-23 RX ADMIN — SENNOSIDES AND DOCUSATE SODIUM 1 TABLET: 50; 8.6 TABLET ORAL at 11:01

## 2022-01-23 NOTE — PROGRESS NOTES
Pharmacist Renal Dose Adjustment Note    Lemuel Allen is a 66 y.o. male being treated with the medication Cefepime.    Patient Data:    Vital Signs (Most Recent):  Temp: 98.6 °F (37 °C) (01/23/22 0745)  Pulse: 68 (01/23/22 1200)  Resp: (!) 23 (01/23/22 1200)  BP: 117/66 (01/23/22 1200)  SpO2: 95 % (01/23/22 1200)   Vital Signs (72h Range):  Temp:  [98.6 °F (37 °C)]   Pulse:  [67-71]   Resp:  [18-23]   BP: (105-122)/(60-72)   SpO2:  [94 %-98 %]      Recent Labs   Lab 01/19/22  1236 01/23/22  0826   CREATININE 1.8* 3.4*     Serum creatinine: 3.4 mg/dL (H) 01/23/22 0826  Estimated creatinine clearance: 26.8 mL/min (A)    Medication:Cefepime 1 gram every 12 hours will be changed to Cefepime 1 gram every 24 hours per pharmacy protocol.    Pharmacist's Name: Samuel Breaux  Pharmacist's Extension: [phone]

## 2022-01-23 NOTE — PHARMACY MED REC
"Admission Medication History     The home medication history was taken by Dru Munroe.    You may go to "Admission" then "Reconcile Home Medications" tabs to review and/or act upon these items.      The home medication list has been updated by the Pharmacy department.    Please read ALL comments highlighted in yellow.    Please address this information as you see fit.     Feel free to contact us if you have any questions or require assistance.        Dru Munroe  [id number]    Current Outpatient Medications on File Prior to Encounter   Medication Sig Dispense Refill Last Dose    amLODIPine (NORVASC) 5 MG tablet Take 5 mg by mouth once daily.   1/23/2022    aspirin (ECOTRIN) 81 MG EC tablet Take 81 mg by mouth once daily.   1/23/2022    carvediloL (COREG) 25 MG tablet Take 25 mg by mouth 2 (two) times daily.   1/23/2022    cholecalciferol, vitamin D3, 1,250 mcg (50,000 unit) capsule Take 1 capsule by mouth twice a week.   1/23/2022    clindamycin (CLEOCIN) 150 MG capsule Take 2 capsules (300 mg total) by mouth 4 (four) times daily. for 7 days 56 capsule 0 1/23/2022    fenofibrate 160 MG Tab Take 160 mg by mouth once daily.   1/23/2022    hydroCHLOROthiazide (HYDRODIURIL) 25 MG tablet Take 25 mg by mouth once daily.   1/23/2022    losartan (COZAAR) 100 MG tablet Take 100 mg by mouth once daily.   1/23/2022    meloxicam (MOBIC) 15 MG tablet Take 15 mg by mouth once daily.   1/23/2022    omeprazole (PRILOSEC) 40 MG capsule Take 40 mg by mouth once daily.   1/23/2022    potassium chloride (MICRO-K) 10 MEQ CpSR Take 10 mEq by mouth once daily.   1/23/2022    rosuvastatin (CRESTOR) 10 MG tablet Take 10 mg by mouth nightly.   1/23/2022    sulfamethoxazole-trimethoprim 800-160mg (BACTRIM DS) 800-160 mg Tab Take 1 tablet by mouth 2 (two) times daily. for 7 days 14 tablet 0 1/23/2022                           .          "

## 2022-01-23 NOTE — SUBJECTIVE & OBJECTIVE
Past Medical History:   Diagnosis Date    Acid reflux     Hyperlipidemia     Hypertension        Past Surgical History:   Procedure Laterality Date    BACK SURGERY      CHOLECYSTECTOMY      JOINT REPLACEMENT      TOTAL KNEE ARTHROPLASTY         Review of patient's allergies indicates:   Allergen Reactions    Betamethasone Swelling    Prednisone        No current facility-administered medications on file prior to encounter.     Current Outpatient Medications on File Prior to Encounter   Medication Sig    amLODIPine (NORVASC) 5 MG tablet Take 5 mg by mouth once daily.    aspirin (ECOTRIN) 81 MG EC tablet Take 81 mg by mouth once daily.    carvediloL (COREG) 25 MG tablet Take 25 mg by mouth 2 (two) times daily.    cholecalciferol, vitamin D3, 1,250 mcg (50,000 unit) capsule Take 1 capsule by mouth twice a week.    clindamycin (CLEOCIN) 150 MG capsule Take 2 capsules (300 mg total) by mouth 4 (four) times daily. for 7 days    fenofibrate 160 MG Tab Take 160 mg by mouth once daily.    hydroCHLOROthiazide (HYDRODIURIL) 25 MG tablet Take 25 mg by mouth once daily.    losartan (COZAAR) 100 MG tablet Take 100 mg by mouth once daily.    meloxicam (MOBIC) 15 MG tablet Take 15 mg by mouth once daily.    omeprazole (PRILOSEC) 40 MG capsule Take 40 mg by mouth once daily.    potassium chloride (MICRO-K) 10 MEQ CpSR Take 10 mEq by mouth once daily.    rosuvastatin (CRESTOR) 10 MG tablet Take 10 mg by mouth nightly.    sulfamethoxazole-trimethoprim 800-160mg (BACTRIM DS) 800-160 mg Tab Take 1 tablet by mouth 2 (two) times daily. for 7 days    [DISCONTINUED] olmesartan (BENICAR) 20 MG tablet Take 2 tablets by mouth once daily.    [DISCONTINUED] traMADoL (ULTRAM) 50 mg tablet Take 1 tablet (50 mg total) by mouth every 6 (six) hours as needed for Pain.     Family History    None       Tobacco Use    Smoking status: Never Smoker    Smokeless tobacco: Never Used   Substance and Sexual Activity    Alcohol use:  Not Currently    Drug use: Never    Sexual activity: Not Currently     Review of Systems   Constitutional: Negative for chills and fever.   Eyes: Negative for photophobia.   Respiratory: Positive for shortness of breath. Negative for cough, chest tightness and wheezing.    Cardiovascular: Negative for chest pain, palpitations and leg swelling.   Gastrointestinal: Negative for abdominal pain, diarrhea, nausea and vomiting.   Genitourinary: Negative for dysuria, flank pain and hematuria.   Musculoskeletal: Positive for arthralgias. Negative for back pain, myalgias and neck pain.   Skin: Positive for color change (right leg redness). Negative for rash and wound.   Neurological: Negative for dizziness, syncope and headaches.   Psychiatric/Behavioral: Negative for agitation.     Objective:     Vital Signs (Most Recent):  Temp: 98.6 °F (37 °C) (01/23/22 0745)  Pulse: 72 (01/23/22 1430)  Resp: (!) 26 (01/23/22 1430)  BP: (!) 120/59 (01/23/22 1430)  SpO2: (!) 94 % (01/23/22 1430) Vital Signs (24h Range):  Temp:  [98.6 °F (37 °C)] 98.6 °F (37 °C)  Pulse:  [67-73] 72  Resp:  [18-26] 26  SpO2:  [94 %-98 %] 94 %  BP: (105-140)/(59-78) 120/59     Weight: 108 kg (238 lb)  Body mass index is 33.19 kg/m².    Physical Exam  Vitals and nursing note reviewed.   HENT:      Head: Normocephalic and atraumatic.   Eyes:      Conjunctiva/sclera: Conjunctivae normal.      Pupils: Pupils are equal, round, and reactive to light.   Cardiovascular:      Rate and Rhythm: Normal rate.      Pulses: Normal pulses.   Pulmonary:      Comments: Increased effort, breath sounds diminished to bases   Abdominal:      General: There is no distension.      Tenderness: There is no abdominal tenderness. There is no guarding.   Musculoskeletal:         General: Tenderness present. Normal range of motion.      Cervical back: Normal range of motion and neck supple.      Right lower leg: Edema present.   Skin:     General: Skin is warm and dry.      Capillary  Refill: Capillary refill takes less than 2 seconds.      Findings: Erythema (RLE ) present.   Neurological:      General: No focal deficit present.      Mental Status: He is alert.   Psychiatric:         Mood and Affect: Mood normal.           CRANIAL NERVES     CN III, IV, VI   Pupils are equal, round, and reactive to light.       Significant Labs:   All pertinent labs within the past 24 hours have been reviewed.  CBC:   Recent Labs   Lab 01/23/22  0826   WBC 8.70   HGB 10.2*   HCT 31.5*   *     CMP:   Recent Labs   Lab 01/23/22  0826   *   K 3.8      CO2 22*      BUN 72*   CREATININE 3.4*   CALCIUM 8.8   PROT 6.8   ALBUMIN 2.5*   BILITOT 0.5   ALKPHOS 220*   AST 47*   ALT 33   ANIONGAP 12   EGFRNONAA 18*     Troponin:   Recent Labs   Lab 01/23/22  0826   TROPONINI 0.008     Urine Studies:   Recent Labs   Lab 01/23/22  1140   COLORU Yellow   APPEARANCEUA Clear   PHUR 6.0   SPECGRAV 1.020   PROTEINUA Negative   GLUCUA Negative   KETONESU Negative   BILIRUBINUA Negative   OCCULTUA Negative   NITRITE Negative   UROBILINOGEN Negative   LEUKOCYTESUR Negative       Significant Imaging: I have reviewed all pertinent imaging results/findings within the past 24 hours.

## 2022-01-23 NOTE — H&P
[facility] - Emergency Dept.  [facility] Medicine  History & Physical    Patient Name: Lemuel Allen  MRN: [id number]  Patient Class: IP- Inpatient  Admission Date: 1/23/2022  Attending Physician: Grecia Harris Do, MD   Primary Care Provider: Michael Fitzpatrick MD         Patient information was obtained from patient and ER records.     Subjective:     Principal Problem:YULISA (acute kidney injury)    Chief Complaint:   Chief Complaint   Patient presents with    Leg Swelling     Pt reports worsening leg swelling since last visit on Wednesday. States he was given meds but it has worsened. Now c/o SOB x 1 day.         HPI: Lemuel Allen is a 65 yo male with past medical history of hypertension, hyperlipidemia and GERD who presented to [facility] ED for evaluation of worsening right lower extremity cellulitis. Patient reports 4 day history of erythema, edema and weeping to right lower extremity. He was evaluated in ED on 1/19/22 for similar complaint. Clindamycin and Bactrim initiated for management of cellulitis. Patient and spouse at bedside reports worsening erythema and edema with fever over the past 24 hours (now resolved) despite compliance with po antibiotics. Patient denies n/v/d and abdominal pain. Of note, he reports shortness of breath exacerbated by exertion and lying flat.  Pt denies known history of COPD and CHF.     ED evaluation: VSS on arrival. Labs remarkable for BUN/Cr 72/3.4 (baseline Cr 1.2-1.4), bnp 527, ferritin 815, , procal 1. CXR shows subtle ground-glass infiltrates in both lung fields which may represent COVID pneumonia. Rapid COVID 19 negative. Venous doppler negative for DVT. He received doxycycline and 2 liters IVF. Patient admitted to hospital medicine for further evaluation.          Past Medical History:   Diagnosis Date    Acid reflux     Hyperlipidemia     Hypertension        Past Surgical History:   Procedure Laterality Date    BACK SURGERY      CHOLECYSTECTOMY      JOINT REPLACEMENT       TOTAL KNEE ARTHROPLASTY         Review of patient's allergies indicates:   Allergen Reactions    Betamethasone Swelling    Prednisone        No current facility-administered medications on file prior to encounter.     Current Outpatient Medications on File Prior to Encounter   Medication Sig    amLODIPine (NORVASC) 5 MG tablet Take 5 mg by mouth once daily.    aspirin (ECOTRIN) 81 MG EC tablet Take 81 mg by mouth once daily.    carvediloL (COREG) 25 MG tablet Take 25 mg by mouth 2 (two) times daily.    cholecalciferol, vitamin D3, 1,250 mcg (50,000 unit) capsule Take 1 capsule by mouth twice a week.    clindamycin (CLEOCIN) 150 MG capsule Take 2 capsules (300 mg total) by mouth 4 (four) times daily. for 7 days    fenofibrate 160 MG Tab Take 160 mg by mouth once daily.    hydroCHLOROthiazide (HYDRODIURIL) 25 MG tablet Take 25 mg by mouth once daily.    losartan (COZAAR) 100 MG tablet Take 100 mg by mouth once daily.    meloxicam (MOBIC) 15 MG tablet Take 15 mg by mouth once daily.    omeprazole (PRILOSEC) 40 MG capsule Take 40 mg by mouth once daily.    potassium chloride (MICRO-K) 10 MEQ CpSR Take 10 mEq by mouth once daily.    rosuvastatin (CRESTOR) 10 MG tablet Take 10 mg by mouth nightly.    sulfamethoxazole-trimethoprim 800-160mg (BACTRIM DS) 800-160 mg Tab Take 1 tablet by mouth 2 (two) times daily. for 7 days    [DISCONTINUED] olmesartan (BENICAR) 20 MG tablet Take 2 tablets by mouth once daily.    [DISCONTINUED] traMADoL (ULTRAM) 50 mg tablet Take 1 tablet (50 mg total) by mouth every 6 (six) hours as needed for Pain.     Family History    None       Tobacco Use    Smoking status: Never Smoker    Smokeless tobacco: Never Used   Substance and Sexual Activity    Alcohol use: Not Currently    Drug use: Never    Sexual activity: Not Currently     Review of Systems   Constitutional: Negative for chills and fever.   Eyes: Negative for photophobia.   Respiratory: Positive for shortness of  breath. Negative for cough, chest tightness and wheezing.    Cardiovascular: Negative for chest pain, palpitations and leg swelling.   Gastrointestinal: Negative for abdominal pain, diarrhea, nausea and vomiting.   Genitourinary: Negative for dysuria, flank pain and hematuria.   Musculoskeletal: Positive for arthralgias. Negative for back pain, myalgias and neck pain.   Skin: Positive for color change (right leg redness). Negative for rash and wound.   Neurological: Negative for dizziness, syncope and headaches.   Psychiatric/Behavioral: Negative for agitation.     Objective:     Vital Signs (Most Recent):  Temp: 98.6 °F (37 °C) (01/23/22 0745)  Pulse: 72 (01/23/22 1430)  Resp: (!) 26 (01/23/22 1430)  BP: (!) 120/59 (01/23/22 1430)  SpO2: (!) 94 % (01/23/22 1430) Vital Signs (24h Range):  Temp:  [98.6 °F (37 °C)] 98.6 °F (37 °C)  Pulse:  [67-73] 72  Resp:  [18-26] 26  SpO2:  [94 %-98 %] 94 %  BP: (105-140)/(59-78) 120/59     Weight: 108 kg (238 lb)  Body mass index is 33.19 kg/m².    Physical Exam  Vitals and nursing note reviewed.   HENT:      Head: Normocephalic and atraumatic.   Eyes:      Conjunctiva/sclera: Conjunctivae normal.      Pupils: Pupils are equal, round, and reactive to light.   Cardiovascular:      Rate and Rhythm: Normal rate.      Pulses: Normal pulses.   Pulmonary:      Comments: Increased effort, breath sounds diminished to bases   Abdominal:      General: There is no distension.      Tenderness: There is no abdominal tenderness. There is no guarding.   Musculoskeletal:         General: Tenderness present. Normal range of motion.      Cervical back: Normal range of motion and neck supple.      Right lower leg: Edema present.   Skin:     General: Skin is warm and dry.      Capillary Refill: Capillary refill takes less than 2 seconds.      Findings: Erythema (RLE ) present.   Neurological:      General: No focal deficit present.      Mental Status: He is alert.   Psychiatric:         Mood and  Affect: Mood normal.           CRANIAL NERVES     CN III, IV, VI   Pupils are equal, round, and reactive to light.       Significant Labs:   All pertinent labs within the past 24 hours have been reviewed.  CBC:   Recent Labs   Lab 01/23/22  0826   WBC 8.70   HGB 10.2*   HCT 31.5*   *     CMP:   Recent Labs   Lab 01/23/22  0826   *   K 3.8      CO2 22*      BUN 72*   CREATININE 3.4*   CALCIUM 8.8   PROT 6.8   ALBUMIN 2.5*   BILITOT 0.5   ALKPHOS 220*   AST 47*   ALT 33   ANIONGAP 12   EGFRNONAA 18*     Troponin:   Recent Labs   Lab 01/23/22  0826   TROPONINI 0.008     Urine Studies:   Recent Labs   Lab 01/23/22  1140   COLORU Yellow   APPEARANCEUA Clear   PHUR 6.0   SPECGRAV 1.020   PROTEINUA Negative   GLUCUA Negative   KETONESU Negative   BILIRUBINUA Negative   OCCULTUA Negative   NITRITE Negative   UROBILINOGEN Negative   LEUKOCYTESUR Negative       Significant Imaging: I have reviewed all pertinent imaging results/findings within the past 24 hours.    Assessment/Plan:     * YULISA (acute kidney injury)  Suspect prerenal   -CR 3.4 on admit (baseline 1.1-1.4), GFR 18   -UA unremarkable  -Urine sodium pending   -Avoid nephrotoxic medications   - renal dose meds   -strict intake and output   - avoid events causing decreased renal perfusion (hypotension, sepsis)       Hyperlipidemia  -continue statin     Hypertension  -stable   - continue amlodipine 5 mg daily and carvedilol 25 mg BID  - hold HCTZ and ARB 2/2 YULISA       Shortness of breath  ? Underlying heart failure   -patient complains of SOB, worse with lying flat and exertion. Denies hx of CHF and COPD  - CXR concerning for COVID 19; ferritin 815, , .7  -    - rapid COVID 19 negative, will check PCR and inflammatory markers  - recommend airborne, contact and droplet precautions for now  - check TTE  - gentle hydration, monitor for signs of volume overload       Cellulitis of right lower extremity  Vs recurrent stasis  dermatitis  - patient presents with report of worsening erythema, edema, fever and weeping to RLE x 4 days   - patient afebrile on arrival , no leukocytosis or lactic acidosis, vitals stable. Procal 1    - clindamycin an  Bactrim initiated 1/19/22, patient reports worsening symptoms despite compliance with antibiotics   -patient with similar event 11/2021, resolved with augmentin   - Venous doppler negative for DVT   - he received doxycycline in ED   - continue Vancomycin and cefepime         VTE Risk Mitigation (From admission, onward)         Ordered     heparin (porcine) injection 5,000 Units  Every 8 hours         01/23/22 1056     IP VTE HIGH RISK PATIENT  Once         01/23/22 1056     Place sequential compression device  Until discontinued         01/23/22 1056                   Tory Alva NP  Department of Hospital Medicine   Critical access hospital - Emergency Dept.

## 2022-01-23 NOTE — PROGRESS NOTES
Pharmacokinetic Initial Assessment: IV Vancomycin    Assessment/Plan:    Initiate intravenous vancomycin with loading dose of 2,000 mg once with subsequent doses when random concentrations are less than 20 mcg/mL  Desired empiric serum trough concentration is 10 to 15 mcg/mL  Draw vancomycin random level on 1/24 at 1330.  Pharmacy will continue to follow and monitor vancomycin.      Please contact pharmacy at extension [phone] with any questions regarding this assessment.     Thank you for the consult,   Samuel Breaux       Patient brief summary:  Lemuel Allen is a 66 y.o. male initiated on antimicrobial therapy with IV Vancomycin for treatment of suspected skin & soft tissue infection    Drug Allergies:   Review of patient's allergies indicates:   Allergen Reactions    Betamethasone Swelling    Prednisone        Actual Body Weight:   108 kg    Renal Function:   Estimated Creatinine Clearance: 26.7 mL/min (A) (based on SCr of 3.4 mg/dL (H)).,     Dialysis Method (if applicable):  N/A    CBC (last 72 hours):  Recent Labs   Lab Result Units 01/23/22  0826   WBC K/uL 8.70   Hemoglobin g/dL 10.2*   Hematocrit % 31.5*   Platelets K/uL 140*   Gran % % 86.7*   Lymph % % 6.0*   Mono % % 3.8*   Eosinophil % % 1.8   Basophil % % 0.2   Differential Method  Automated       Metabolic Panel (last 72 hours):  Recent Labs   Lab Result Units 01/23/22  0826 01/23/22  1140   Sodium mmol/L 134*  --    Sodium, Urine mmol/L  --  21   Potassium mmol/L 3.8  --    Chloride mmol/L 100  --    CO2 mmol/L 22*  --    Glucose mg/dL 108  --    Glucose, UA   --  Negative   BUN mg/dL 72*  --    Creatinine mg/dL 3.4*  --    Albumin g/dL 2.5*  --    Total Bilirubin mg/dL 0.5  --    Alkaline Phosphatase U/L 220*  --    AST U/L 47*  --    ALT U/L 33  --        Drug levels (last 3 results):  No results for input(s): VANCOMYCINRA, VANCOMYCINPE, VANCOMYCINTR in the last 72 hours.    Microbiologic Results:  Microbiology Results (last 7 days)     Procedure  Component Value Units Date/Time    Blood culture #1 **CANNOT BE ORDERED STAT** [[id number]] Collected: 01/23/22 0836    Order Status: Sent Specimen: Blood from Peripheral, Forearm, Left Updated: 01/23/22 0837    Blood culture #2 **CANNOT BE ORDERED STAT** [[id number]] Collected: 01/23/22 0836    Order Status: Sent Specimen: Blood from Peripheral, Antecubital, Left Updated: 01/23/22 0837

## 2022-01-23 NOTE — ASSESSMENT & PLAN NOTE
? Underlying heart failure   -patient complains of SOB, worse with lying flat and exertion. Denies hx of CHF and COPD  - CXR concerning for COVID 19; ferritin 815, , .7  -    - rapid COVID 19 negative, will check PCR and inflammatory markers  - recommend airborne, contact and droplet precautions for now  - check TTE  - gentle hydration, monitor for signs of volume overload

## 2022-01-23 NOTE — ED PROVIDER NOTES
SCRIBE #1 NOTE: I, Jennifer Finley, am scribing for, and in the presence of, Grecia Harris Do, MD. I have scribed the entire note.       History     Chief Complaint   Patient presents with    Leg Swelling     Pt reports worsening leg swelling since last visit on Wednesday. States he was given meds but it has worsened. Now c/o SOB x 1 day.      Review of patient's allergies indicates:   Allergen Reactions    Betamethasone Swelling    Prednisone          History of Present Illness     HPI    1/23/2022, 8:04 AM  History obtained from the patient      History of Present Illness: Lemuel Allen is a 66 y.o. male patient with a PMHx of cellulitis and HTN who presents to the Emergency Department for evaluation of RLE swelling which onset gradually 4 days PTA. Symptoms are constant and moderate in severity and worsening.. No mitigating or exacerbating factors reported. Associated sxs include fever for 1 day (resolved) and RLE redness. Patient denies any nausea, vomiting, SOB, CP, back pain, and all other sxs at this time. Pt was here in the ED 4 days PTA and was prescribed Clindamycin and Bactrim. No further complaints or concerns at this time.       Arrival mode: Personal vehicle    PCP: Michael Fitzpatrick MD        Past Medical History:  Past Medical History:   Diagnosis Date    Acid reflux     Hyperlipidemia     Hypertension        Past Surgical History:  Past Surgical History:   Procedure Laterality Date    BACK SURGERY      CHOLECYSTECTOMY      JOINT REPLACEMENT      TOTAL KNEE ARTHROPLASTY           Family History:  History reviewed. No pertinent family history.    Social History:  Social History     Tobacco Use    Smoking status: Never Smoker    Smokeless tobacco: Never Used   Substance and Sexual Activity    Alcohol use: Not Currently    Drug use: Never    Sexual activity: Not Currently        Review of Systems     Review of Systems   Constitutional: Positive for fever (resolved).   HENT: Negative for sore  throat.    Respiratory: Negative for shortness of breath.    Cardiovascular: Negative for chest pain.   Gastrointestinal: Negative for nausea and vomiting.   Genitourinary: Negative for dysuria.   Musculoskeletal: Negative for back pain.        (+) RLE swelling   Skin: Positive for color change (RLE). Negative for rash.   Neurological: Negative for weakness.   Hematological: Does not bruise/bleed easily.   All other systems reviewed and are negative.       Physical Exam     Initial Vitals [01/23/22 0745]   BP Pulse Resp Temp SpO2   113/60 71 18 98.6 °F (37 °C) 98 %      MAP       --          Physical Exam  Nursing Notes and Vital Signs Reviewed.  Constitutional: Patient is in no acute distress. Well-developed and well-nourished.  Head: Atraumatic. Normocephalic.  Eyes: PERRL. EOM intact. Conjunctivae are not pale. No scleral icterus.  ENT: Mucous membranes are moist. Oropharynx is clear and symmetric.    Neck: Supple. Full ROM. No lymphadenopathy.  Cardiovascular: Regular rate. Regular rhythm. No murmurs, rubs, or gallops. Distal pulses are 2+ and symmetric.  Pulmonary/Chest: No respiratory distress. Clear to auscultation bilaterally. No wheezing or rales.  Abdominal: Soft and non-distended.  There is no tenderness.  No rebound, guarding, or rigidity. Good bowel sounds.  Genitourinary: No CVA tenderness. Scrotum and penile area does not show any signs of infection. There are no necrotic or cellulitis areas.  Musculoskeletal: Moves all extremities. No obvious deformities. R leg swollen. Showing signs of cellulitis to R lower leg and radiating up to medial thigh.  Skin: Warm and dry.  Neurological:  Alert, awake, and appropriate.  Normal speech.  No acute focal neurological deficits are appreciated.  Psychiatric: Normal affect. Good eye contact. Appropriate in content.               ED Course   Critical Care    Date/Time: 1/23/2022 10:20 AM  Performed by: Grecia Harris Do, MD  Authorized by: Grecia Harris Do, MD  "  Direct patient critical care time: 5 minutes  Additional history critical care time: 5 minutes  Ordering / reviewing critical care time: 5 minutes  Documentation critical care time: 10 minutes  Consulting other physicians critical care time: 10 minutes  Total critical care time (exclusive of procedural time) : 35 minutes  Critical care time was exclusive of separately billable procedures and treating other patients and teaching time.  Critical care was necessary to treat or prevent imminent or life-threatening deterioration of the following conditions: renal failure (worsening renal failure; worsening cellulitis).  Critical care was time spent personally by me on the following activities: blood draw for specimens, development of treatment plan with patient or surrogate, discussions with consultants, interpretation of cardiac output measurements, evaluation of patient's response to treatment, examination of patient, obtaining history from patient or surrogate, ordering and performing treatments and interventions, ordering and review of laboratory studies, ordering and review of radiographic studies, pulse oximetry, re-evaluation of patient's condition and review of old charts.        ED Vital Signs:  Vitals:    01/23/22 0745 01/23/22 0810 01/23/22 0830 01/23/22 0852   BP: 113/60  110/61    Pulse: 71 68 70    Resp: 18  20 18   Temp: 98.6 °F (37 °C)      TempSrc: Oral      SpO2: 98%  95%    Weight: 108.3 kg (238 lb 10.4 oz)      Height: 5' 11" (1.803 m)       01/23/22 0900 01/23/22 0930 01/23/22 1000   BP: 115/66 105/62 111/61   Pulse: 70 68 67   Resp: 20 (!) 22 (!) 22   Temp:      TempSrc:      SpO2: 95% 95% 96%   Weight:      Height:          Abnormal Lab Results:  Labs Reviewed   CBC W/ AUTO DIFFERENTIAL - Abnormal; Notable for the following components:       Result Value    RBC 3.47 (*)     Hemoglobin 10.2 (*)     Hematocrit 31.5 (*)     RDW 14.8 (*)     Platelets 140 (*)     Immature Granulocytes 1.5 (*)     " Immature Grans (Abs) 0.13 (*)     Lymph # 0.5 (*)     Gran % 86.7 (*)     Lymph % 6.0 (*)     Mono % 3.8 (*)     All other components within normal limits   COMPREHENSIVE METABOLIC PANEL - Abnormal; Notable for the following components:    Sodium 134 (*)     CO2 22 (*)     BUN 72 (*)     Creatinine 3.4 (*)     Albumin 2.5 (*)     Alkaline Phosphatase 220 (*)     AST 47 (*)     eGFR if  21 (*)     eGFR if non  18 (*)     All other components within normal limits   B-TYPE NATRIURETIC PEPTIDE - Abnormal; Notable for the following components:     (*)     All other components within normal limits   CULTURE, BLOOD   CULTURE, BLOOD   TROPONIN I   LACTIC ACID, PLASMA   SARS-COV-2 RNA AMPLIFICATION, QUAL   PROCALCITONIN        All Lab Results:  Results for orders placed or performed during the hospital encounter of 01/23/22   CBC auto differential   Result Value Ref Range    WBC 8.70 3.90 - 12.70 K/uL    RBC 3.47 (L) 4.60 - 6.20 M/uL    Hemoglobin 10.2 (L) 14.0 - 18.0 g/dL    Hematocrit 31.5 (L) 40.0 - 54.0 %    MCV 91 82 - 98 fL    MCH 29.4 27.0 - 31.0 pg    MCHC 32.4 32.0 - 36.0 g/dL    RDW 14.8 (H) 11.5 - 14.5 %    Platelets 140 (L) 150 - 450 K/uL    MPV 10.7 9.2 - 12.9 fL    Immature Granulocytes 1.5 (H) 0.0 - 0.5 %    Gran # (ANC) 7.5 1.8 - 7.7 K/uL    Immature Grans (Abs) 0.13 (H) 0.00 - 0.04 K/uL    Lymph # 0.5 (L) 1.0 - 4.8 K/uL    Mono # 0.3 0.3 - 1.0 K/uL    Eos # 0.2 0.0 - 0.5 K/uL    Baso # 0.02 0.00 - 0.20 K/uL    nRBC 0 0 /100 WBC    Gran % 86.7 (H) 38.0 - 73.0 %    Lymph % 6.0 (L) 18.0 - 48.0 %    Mono % 3.8 (L) 4.0 - 15.0 %    Eosinophil % 1.8 0.0 - 8.0 %    Basophil % 0.2 0.0 - 1.9 %    Platelet Estimate Appears normal     Differential Method Automated    Comprehensive metabolic panel   Result Value Ref Range    Sodium 134 (L) 136 - 145 mmol/L    Potassium 3.8 3.5 - 5.1 mmol/L    Chloride 100 95 - 110 mmol/L    CO2 22 (L) 23 - 29 mmol/L    Glucose 108 70 - 110 mg/dL     BUN 72 (H) 8 - 23 mg/dL    Creatinine 3.4 (H) 0.5 - 1.4 mg/dL    Calcium 8.8 8.7 - 10.5 mg/dL    Total Protein 6.8 6.0 - 8.4 g/dL    Albumin 2.5 (L) 3.5 - 5.2 g/dL    Total Bilirubin 0.5 0.1 - 1.0 mg/dL    Alkaline Phosphatase 220 (H) 55 - 135 U/L    AST 47 (H) 10 - 40 U/L    ALT 33 10 - 44 U/L    Anion Gap 12 8 - 16 mmol/L    eGFR if African American 21 (A) >60 mL/min/1.73 m^2    eGFR if non African American 18 (A) >60 mL/min/1.73 m^2   Troponin I #1   Result Value Ref Range    Troponin I 0.008 0.000 - 0.026 ng/mL   BNP   Result Value Ref Range     (H) 0 - 99 pg/mL   Lactic acid, plasma   Result Value Ref Range    Lactate (Lactic Acid) 0.7 0.5 - 2.2 mmol/L   COVID-19 Rapid Screening   Result Value Ref Range    SARS-CoV-2 RNA, Amplification, Qual Negative Negative       Imaging Results:  Imaging Results          US Lower Extremity Veins Right (Final result)  Result time 01/23/22 09:25:10    Final result by Juan Navarro MD (01/23/22 09:25:10)                 Impression:      No sonographic evidence of deep venous thrombosis is identified.      Electronically signed by: Juan Navarro  Date:    01/23/2022  Time:    09:25             Narrative:    EXAMINATION:  US LOWER EXTREMITY VEINS RIGHT    CLINICAL HISTORY:  Edema, unspecified    COMPARISON:  None    FINDINGS:  Sonographic evaluation of the common femoral, superficial femoral, popliteal, and posterior tibial veins of right lower extremity demonstrates no sonographic evidence of deep venous thrombosis.                                 The EKG was ordered, reviewed, and independently interpreted by the ED provider.  Interpretation time: 8:10  Rate: 68 BPM  Rhythm: normal sinus rhythm  Interpretation: Nonspecific ST abnormality. No STEMI.             The Emergency Provider reviewed the vital signs and test results, which are outlined above.     ED Discussion       10:16 AM: Discussed case with Anjali Page NP (Hospital Medicine). Dr. Ga agrees with  current care and management of pt and accepts admission.   Admitting Service: Hospital Medicine  Admitting Physician: Dr. Ga  Admit to: Inpatient, med-surg    10:16 AM: Re-evaluated pt. I have discussed test results, shared treatment plan, and the need for admission with patient and family at bedside. Pt and family express understanding at this time and agree with all information. All questions answered. Pt and family have no further questions or concerns at this time. Pt is ready for admit.       Medical Decision Making:   Clinical Tests:   Lab Tests: Ordered and Reviewed  Radiological Study: Ordered and Reviewed  Medical Tests: Ordered and Reviewed           ED Medication(s):  Medications   dextrose 5 % infusion ( Intravenous Stopped 1/23/22 1108)   sodium chloride 0.9% flush 10 mL (has no administration in time range)   senna-docusate 8.6-50 mg per tablet 1 tablet (has no administration in time range)   ondansetron injection 4 mg (has no administration in time range)   albuterol-ipratropium 2.5 mg-0.5 mg/3 mL nebulizer solution 3 mL (has no administration in time range)   melatonin tablet 6 mg (has no administration in time range)   simethicone chewable tablet 80 mg (has no administration in time range)   aluminum-magnesium hydroxide-simethicone 200-200-20 mg/5 mL suspension 30 mL (has no administration in time range)   heparin (porcine) injection 5,000 Units (has no administration in time range)   acetaminophen tablet 650 mg (has no administration in time range)   doxycycline (VIBRAMYCIN) 100mg in dextrose 5% 250 mL IVPB (ready to mix) (0 mg Intravenous Stopped 1/23/22 1027)   ondansetron injection 4 mg (4 mg Intravenous Given 1/23/22 0852)   morphine injection 4 mg (4 mg Intravenous Given 1/23/22 0852)   sodium chloride 0.9% bolus 1,000 mL (0 mLs Intravenous Stopped 1/23/22 1017)       New Prescriptions    No medications on file               Scribe Attestation:   Scribe #1: I performed the above scribed  service and the documentation accurately describes the services I performed. I attest to the accuracy of the note.     Attending:   Physician Attestation Statement for Scribe #1: I, Grecia Harris Do, MD, personally performed the services described in this documentation, as scribed by Jeninfer Finley, in my presence, and it is both accurate and complete.           Clinical Impression       ICD-10-CM ICD-9-CM   1. Cellulitis of right lower extremity  L03.115 682.6   2. Chest pain  R07.9 786.50   3. Swelling  R60.9 782.3   4. Acute renal failure superimposed on chronic kidney disease, unspecified CKD stage, unspecified acute renal failure type  N17.9 584.9    N18.9 585.9       Disposition:   Disposition: Admitted  Condition: Fair       Grecia Harris Do, MD  01/23/22 1109       Grecia Harris Do, MD  01/23/22 1417

## 2022-01-23 NOTE — ASSESSMENT & PLAN NOTE
Vs recurrent stasis dermatitis  - patient presents with report of worsening erythema, edema, fever and weeping to RLE x 4 days   - patient afebrile on arrival , no leukocytosis or lactic acidosis, vitals stable. Procal 1    - clindamycin an  Bactrim initiated 1/19/22, patient reports worsening symptoms despite compliance with antibiotics   -patient with similar event 11/2021, resolved with augmentin   - Venous doppler negative for DVT   - he received doxycycline in ED   - continue Vancomycin and cefepime

## 2022-01-23 NOTE — ED NOTES
The patient is sitting up on side of bed, airway is open and patent, pt feels SOB and states its easier to breathe sitting up, pt is tachypneic. Hospital med notified via secure chat. Will continue to monitor.

## 2022-01-23 NOTE — HPI
Lemuel Allen is a 67 yo male with past medical history of hypertension, hyperlipidemia and GERD who presented to [facility] ED for evaluation of worsening right lower extremity cellulitis. Patient reports 4 day history of erythema, edema and weeping to right lower extremity. He was evaluated in ED on 1/19/22 for similar complaint. Clindamycin and Bactrim initiated for management of cellulitis. Patient and spouse at bedside reports worsening erythema and edema with fever over the past 24 hours (now resolved) despite compliance with po antibiotics. Patient denies n/v/d and abdominal pain. Of note, he reports shortness of breath exacerbated by exertion and lying flat.  Pt denies known history of COPD and CHF.     ED evaluation: VSS on arrival. Labs remarkable for BUN/Cr 72/3.4 (baseline Cr 1.2-1.4), bnp 527, ferritin 815, , procal 1. CXR shows subtle ground-glass infiltrates in both lung fields which may represent COVID pneumonia. Rapid COVID 19 negative. Venous doppler negative for DVT. He received doxycycline and 2 liters IVF. Patient admitted to hospital medicine for further evaluation.

## 2022-01-23 NOTE — ED NOTES
Pt in NAD, VSS, Resp e/u. Pt slightly tachypneic. Stretcher locked in lowest position. Side rails up x2. Call bell within reach. Will continue to monitor.

## 2022-01-23 NOTE — ASSESSMENT & PLAN NOTE
-stable   - continue amlodipine 5 mg daily and carvedilol 25 mg BID  - hold HCTZ and ARB 2/2 YULISA

## 2022-01-23 NOTE — ASSESSMENT & PLAN NOTE
Suspect prerenal   -CR 3.4 on admit (baseline 1.1-1.4), GFR 18   -UA unremarkable  -Urine sodium pending   -Avoid nephrotoxic medications   - renal dose meds   -strict intake and output   - avoid events causing decreased renal perfusion (hypotension, sepsis)

## 2022-01-24 LAB
ALBUMIN SERPL BCP-MCNC: 2.4 G/DL (ref 3.5–5.2)
ALP SERPL-CCNC: 243 U/L (ref 55–135)
ALT SERPL W/O P-5'-P-CCNC: 34 U/L (ref 10–44)
ANION GAP SERPL CALC-SCNC: 13 MMOL/L (ref 8–16)
ANISOCYTOSIS BLD QL SMEAR: SLIGHT
AST SERPL-CCNC: 46 U/L (ref 10–40)
BACTERIA BLD CULT: NORMAL
BACTERIA BLD CULT: NORMAL
BASOPHILS # BLD AUTO: 0.02 K/UL (ref 0–0.2)
BASOPHILS NFR BLD: 0.2 % (ref 0–1.9)
BILIRUB SERPL-MCNC: 0.5 MG/DL (ref 0.1–1)
BUN SERPL-MCNC: 66 MG/DL (ref 8–23)
CALCIUM SERPL-MCNC: 7.9 MG/DL (ref 8.7–10.5)
CHLORIDE SERPL-SCNC: 103 MMOL/L (ref 95–110)
CO2 SERPL-SCNC: 19 MMOL/L (ref 23–29)
CREAT SERPL-MCNC: 2.4 MG/DL (ref 0.5–1.4)
DIFFERENTIAL METHOD: ABNORMAL
EOSINOPHIL # BLD AUTO: 0.1 K/UL (ref 0–0.5)
EOSINOPHIL NFR BLD: 1.6 % (ref 0–8)
ERYTHROCYTE [DISTWIDTH] IN BLOOD BY AUTOMATED COUNT: 15 % (ref 11.5–14.5)
EST. GFR  (AFRICAN AMERICAN): 31 ML/MIN/1.73 M^2
EST. GFR  (NON AFRICAN AMERICAN): 27 ML/MIN/1.73 M^2
GLUCOSE SERPL-MCNC: 95 MG/DL (ref 70–110)
HCT VFR BLD AUTO: 31 % (ref 40–54)
HGB BLD-MCNC: 9.9 G/DL (ref 14–18)
HYPOCHROMIA BLD QL SMEAR: ABNORMAL
IMM GRANULOCYTES # BLD AUTO: 0.18 K/UL (ref 0–0.04)
IMM GRANULOCYTES NFR BLD AUTO: 2.1 % (ref 0–0.5)
LYMPHOCYTES # BLD AUTO: 0.8 K/UL (ref 1–4.8)
LYMPHOCYTES NFR BLD: 9.6 % (ref 18–48)
MCH RBC QN AUTO: 29.5 PG (ref 27–31)
MCHC RBC AUTO-ENTMCNC: 31.9 G/DL (ref 32–36)
MCV RBC AUTO: 92 FL (ref 82–98)
MONOCYTES # BLD AUTO: 0.5 K/UL (ref 0.3–1)
MONOCYTES NFR BLD: 5.7 % (ref 4–15)
NEUTROPHILS # BLD AUTO: 7.1 K/UL (ref 1.8–7.7)
NEUTROPHILS NFR BLD: 80.8 % (ref 38–73)
NRBC BLD-RTO: 0 /100 WBC
OVALOCYTES BLD QL SMEAR: ABNORMAL
PLATELET # BLD AUTO: 148 K/UL (ref 150–450)
PLATELET BLD QL SMEAR: ABNORMAL
PMV BLD AUTO: 10.2 FL (ref 9.2–12.9)
POIKILOCYTOSIS BLD QL SMEAR: SLIGHT
POTASSIUM SERPL-SCNC: 4.1 MMOL/L (ref 3.5–5.1)
PROT SERPL-MCNC: 5.7 G/DL (ref 6–8.4)
RBC # BLD AUTO: 3.36 M/UL (ref 4.6–6.2)
SODIUM SERPL-SCNC: 135 MMOL/L (ref 136–145)
VANCOMYCIN SERPL-MCNC: 9.7 UG/ML
WBC # BLD AUTO: 8.74 K/UL (ref 3.9–12.7)

## 2022-01-24 PROCEDURE — 87070 CULTURE OTHR SPECIMN AEROBIC: CPT | Performed by: INTERNAL MEDICINE

## 2022-01-24 PROCEDURE — 87076 CULTURE ANAEROBE IDENT EACH: CPT | Performed by: INTERNAL MEDICINE

## 2022-01-24 PROCEDURE — 36415 COLL VENOUS BLD VENIPUNCTURE: CPT | Performed by: EMERGENCY MEDICINE

## 2022-01-24 PROCEDURE — 20000000 HC ICU ROOM

## 2022-01-24 PROCEDURE — 25000003 PHARM REV CODE 250: Performed by: INTERNAL MEDICINE

## 2022-01-24 PROCEDURE — 25000242 PHARM REV CODE 250 ALT 637 W/ HCPCS: Performed by: INTERNAL MEDICINE

## 2022-01-24 PROCEDURE — 80202 ASSAY OF VANCOMYCIN: CPT | Performed by: EMERGENCY MEDICINE

## 2022-01-24 PROCEDURE — 63600175 PHARM REV CODE 636 W HCPCS: Performed by: INTERNAL MEDICINE

## 2022-01-24 PROCEDURE — 27000221 HC OXYGEN, UP TO 24 HOURS

## 2022-01-24 PROCEDURE — 25000242 PHARM REV CODE 250 ALT 637 W/ HCPCS: Performed by: NURSE PRACTITIONER

## 2022-01-24 PROCEDURE — 36415 COLL VENOUS BLD VENIPUNCTURE: CPT | Performed by: NURSE PRACTITIONER

## 2022-01-24 PROCEDURE — 85025 COMPLETE CBC W/AUTO DIFF WBC: CPT | Performed by: NURSE PRACTITIONER

## 2022-01-24 PROCEDURE — 63600175 PHARM REV CODE 636 W HCPCS: Performed by: NURSE PRACTITIONER

## 2022-01-24 PROCEDURE — 80053 COMPREHEN METABOLIC PANEL: CPT | Performed by: NURSE PRACTITIONER

## 2022-01-24 PROCEDURE — 87075 CULTR BACTERIA EXCEPT BLOOD: CPT | Performed by: INTERNAL MEDICINE

## 2022-01-24 PROCEDURE — 27000207 HC ISOLATION

## 2022-01-24 PROCEDURE — 25000003 PHARM REV CODE 250: Performed by: NURSE PRACTITIONER

## 2022-01-24 PROCEDURE — 94640 AIRWAY INHALATION TREATMENT: CPT

## 2022-01-24 RX ORDER — CEFEPIME HYDROCHLORIDE 1 G/50ML
1 INJECTION, SOLUTION INTRAVENOUS
Status: DISCONTINUED | OUTPATIENT
Start: 2022-01-24 | End: 2022-01-26

## 2022-01-24 RX ADMIN — PANTOPRAZOLE SODIUM 40 MG: 40 TABLET, DELAYED RELEASE ORAL at 08:01

## 2022-01-24 RX ADMIN — AMLODIPINE BESYLATE 5 MG: 5 TABLET ORAL at 08:01

## 2022-01-24 RX ADMIN — ALBUTEROL SULFATE 2.5 MG: 2.5 SOLUTION RESPIRATORY (INHALATION) at 07:01

## 2022-01-24 RX ADMIN — CARVEDILOL 25 MG: 12.5 TABLET, FILM COATED ORAL at 08:01

## 2022-01-24 RX ADMIN — VANCOMYCIN HYDROCHLORIDE 1500 MG: 1.5 INJECTION, POWDER, LYOPHILIZED, FOR SOLUTION INTRAVENOUS at 04:01

## 2022-01-24 RX ADMIN — FENOFIBRATE 160 MG: 160 TABLET ORAL at 08:01

## 2022-01-24 RX ADMIN — SENNOSIDES AND DOCUSATE SODIUM 1 TABLET: 50; 8.6 TABLET ORAL at 08:01

## 2022-01-24 RX ADMIN — ALBUTEROL SULFATE 2.5 MG: 2.5 SOLUTION RESPIRATORY (INHALATION) at 01:01

## 2022-01-24 RX ADMIN — HEPARIN SODIUM 5000 UNITS: 5000 INJECTION INTRAVENOUS; SUBCUTANEOUS at 11:01

## 2022-01-24 RX ADMIN — ASPIRIN 81 MG: 81 TABLET, COATED ORAL at 08:01

## 2022-01-24 RX ADMIN — CEFEPIME HYDROCHLORIDE 1 G: 1 INJECTION, SOLUTION INTRAVENOUS at 03:01

## 2022-01-24 RX ADMIN — HEPARIN SODIUM 5000 UNITS: 5000 INJECTION INTRAVENOUS; SUBCUTANEOUS at 03:01

## 2022-01-24 RX ADMIN — ATORVASTATIN CALCIUM 40 MG: 40 TABLET, FILM COATED ORAL at 08:01

## 2022-01-24 RX ADMIN — HEPARIN SODIUM 5000 UNITS: 5000 INJECTION INTRAVENOUS; SUBCUTANEOUS at 05:01

## 2022-01-24 NOTE — PLAN OF CARE
[facility] - Intensive Care (Hospital)  Initial Discharge Assessment       Primary Care Provider: Michael Fitzpatrick MD    Admission Diagnosis: Shortness of breath [R06.02]  Swelling [R60.9]  Cellulitis of right lower extremity [L03.115]  Chest pain [R07.9]  Acute renal failure superimposed on chronic kidney disease, unspecified CKD stage, unspecified acute renal failure type [N17.9, N18.9]    Admission Date: 1/23/2022  Expected Discharge Date:     Discharge Barriers Identified: None    Payor: BLUE CROSS BLUE SHIELD / Plan: [employer] [place] / Product Type: Commercial /     Extended Emergency Contact Information  Primary Emergency Contact: Joycelyn Allen   [facility]  Home Phone: [phone]  Relation: Spouse    Discharge Plan A: Home with family         [place] [address] [address] [address]  [id number] [address]  [facility] [address]  Phone: [phone] Fax: [phone]      Initial Assessment (most recent)     Adult Discharge Assessment - 01/24/22 1537        Discharge Assessment    Assessment Type Discharge Planning Assessment     Confirmed/corrected address, phone number and insurance Yes     Confirmed Demographics Correct on Facesheet     Source of Information patient     When was your last doctors appointment? 12/02/21     Communicated JUSTIN with patient/caregiver Date not available/Unable to determine     Reason For Admission Cellulitis of right lower extremity     Lives With other (see comments);spouse   sister in law    Facility Arrived From: home     Do you expect to return to your current living situation? Yes     Do you have help at home or someone to help you manage your care at home? Yes     Who are your caregiver(s) and their phone number(s)? Spouse, Joycelyn Allen     Prior to hospitilization cognitive status: Alert/Oriented     Current cognitive status: Alert/Oriented     Walking or Climbing Stairs Difficulty none     Dressing/Bathing Difficulty none     Home Accessibility  wheelchair accessible     Home Layout Able to live on 1st floor     Equipment Currently Used at Home none     Readmission within 30 days? No     Patient currently being followed by outpatient case management? No     Do you currently have service(s) that help you manage your care at home? No     Do you take prescription medications? Yes     Do you have prescription coverage? Yes     Coverage Medicare     Do you have any problems affording any of your prescribed medications? No     Is the patient taking medications as prescribed? yes     Who is going to help you get home at discharge? spouse     How do you get to doctors appointments? car, drives self     Are you on dialysis? No     Do you take coumadin? No     Discharge Plan A Home with family     DME Needed Upon Discharge  none     Discharge Plan discussed with: Patient     Discharge Barriers Identified None        Relationship/Environment    Name(s) of Who Lives With Patient Joycelyn Allen               Anticipated DC dispo: home with family   Prior Level of Function: independent   PCP: Michael Fitzpatrick MD    Comments:  CM spoke to patient to introduce role and discuss discharge planning. Patient lives with spouse and sister in law. Spouse, Joycelyn will also be help at home and can provide transport. CM discharge needs depends on hospital progress. CM will continue following to assist with other needs.

## 2022-01-24 NOTE — CONSULTS
[facility] - Intensive Care ([facility])  Wound Care    Patient Name:  Lemuel Allen   MRN:  [id number]  Date: 1/24/2022  Diagnosis: YULISA (acute kidney injury)    History:     Past Medical History:   Diagnosis Date    Acid reflux     Hyperlipidemia     Hypertension        Social History     Socioeconomic History    Marital status:    Tobacco Use    Smoking status: Never Smoker    Smokeless tobacco: Never Used   Substance and Sexual Activity    Alcohol use: Not Currently    Drug use: Never    Sexual activity: Not Currently       Precautions:     Allergies as of 01/23/2022 - Reviewed 01/23/2022   Allergen Reaction Noted    Betamethasone Swelling 11/07/2012    Prednisone  01/19/2022       [facility] Assessment Details/Treatment     Consulted on Mr. Allen due to weeping cellulitis to RLE. He is awake and alert, sitting up on side of bed when I entered room. Back to bed independently.   RLE with 3+ pitting edema and erythema noted from ankle to knee. Small open blister noted to lateral ankle and lateral upper shin regions with yellow/serous fluid noted on pad. Cleansed with saline and patted dry. Scant active weeping, so expressed more fluid manually to obtain swab culture per order and provided swab to nurse. Foam dressings applied to open areas. Recommend change weekly and prn excess drainage. If stays saturated, can leave DIPTI with foam pad.   Will follow.     01/24/2022

## 2022-01-24 NOTE — ASSESSMENT & PLAN NOTE
Uncertain if there is undiagnosed heart failure.  Cardiac echo showed normal systolic and diastolic function.  Patient complains of SOB, worse with lying flat and exertion. Denies hx of CHF and COPD.  - CXR concerning for COVID 19 vs congestion; ferritin 815, , .7.  BNP very high at 527.  Patient denies symptoms that would suggest a respiratory viral syndrome.  - rapid COVID 19 negative, will check PCR and inflammatory markers  - gentle hydration, monitor for signs of volume overload

## 2022-01-24 NOTE — ASSESSMENT & PLAN NOTE
Suspect prerenal but could have been caused or exacerbated by Bactrim   -CR 3.4 on admit (baseline 1.1-1.4), GFR 18   -UA unremarkable  -Urine sodium is 21.  -Avoid nephrotoxic medications   - renal dose meds   -strict intake and output   - avoid events causing decreased renal perfusion (hypotension, sepsis)

## 2022-01-24 NOTE — CONSULTS
Pharmacokinetic Assessment Follow Up: IV Vancomycin    Vancomycin serum concentration assessment(s):    · The random level was drawn correctly and can be used to guide therapy at this time. The measurement is below the desired definitive target range of 10 to 15 mcg/mL.    Vancomycin Regimen Plan:    · Vancomycin 1500 mg IV x 1  · Re-dose when the random level is less than 20 mcg/mL, next level to be drawn at 0430 on 1/25/22    Drug levels (last 3 results):  Recent Labs   Lab Result Units 01/24/22  1322   Vancomycin, Random ug/mL 9.7       Pharmacy will continue to follow and monitor vancomycin.    Please contact pharmacy at extension [phone] for questions regarding this assessment.    Thank you for the consult,   Darline Maxwell, PharmD       Patient brief summary:  Lemuel Allen is a 66 y.o. male initiated on antimicrobial therapy with IV Vancomycin for treatment of skin & soft tissue infection    Drug Allergies:   Review of patient's allergies indicates:   Allergen Reactions    Betamethasone Swelling    Prednisone        Actual Body Weight:   108.1 kg    Renal Function:   Estimated Creatinine Clearance: 37.9 mL/min (A) (based on SCr of 2.4 mg/dL (H)).,     Dialysis Method (if applicable):  N/A    CBC (last 72 hours):  Recent Labs   Lab Result Units 01/23/22  0826 01/24/22  0411   WBC K/uL 8.70 8.74   Hemoglobin g/dL 10.2* 9.9*   Hematocrit % 31.5* 31.0*   Platelets K/uL 140* 148*   Gran % % 86.7* 80.8*   Lymph % % 6.0* 9.6*   Mono % % 3.8* 5.7   Eosinophil % % 1.8 1.6   Basophil % % 0.2 0.2   Differential Method  Automated Automated       Metabolic Panel (last 72 hours):  Recent Labs   Lab Result Units 01/23/22  0826 01/23/22  1140 01/23/22  1417 01/24/22  0411   Sodium mmol/L 134*  --  137 135*   Sodium, Urine mmol/L  --  21  --   --    Potassium mmol/L 3.8  --  4.3 4.1   Chloride mmol/L 100  --  105 103   CO2 mmol/L 22*  --  20* 19*   Glucose mg/dL 108  --  104 95   Glucose, UA   --  Negative  --   --    BUN  mg/dL 72*  --  64* 66*   Creatinine mg/dL 3.4*  --  2.8* 2.4*   Albumin g/dL 2.5*  --   --  2.4*   Total Bilirubin mg/dL 0.5  --   --  0.5   Alkaline Phosphatase U/L 220*  --   --  243*   AST U/L 47*  --   --  46*   ALT U/L 33  --   --  34       Vancomycin Administrations:  vancomycin given in the last 96 hours                   vancomycin 2 g in dextrose 5 % 500 mL IVPB (mg) 2,000 mg New Bag 01/23/22 1422                Microbiologic Results:  Microbiology Results (last 7 days)     Procedure Component Value Units Date/Time    Culture, Anaerobe [[id number]]     Order Status: No result Specimen: Wound from Leg, Right     Aerobic culture [[id number]]     Order Status: No result Specimen: Wound from Leg, Right     Blood culture #1 **CANNOT BE ORDERED STAT** [[id number]] Collected: 01/23/22 0836    Order Status: Completed Specimen: Blood from Peripheral, Forearm, Left Updated: 01/24/22 0115     Blood Culture, Routine No Growth to date    Blood culture #2 **CANNOT BE ORDERED STAT** [[id number]] Collected: 01/23/22 0836    Order Status: Completed Specimen: Blood from Peripheral, Antecubital, Left Updated: 01/24/22 0115     Blood Culture, Routine No Growth to date

## 2022-01-24 NOTE — NURSING
Received patient from M/S floor for Covid Rule out. Patient with no complaints. Afebrile and VSS. Wife notified of transfer.

## 2022-01-24 NOTE — PROGRESS NOTES
Pharmacist Renal Dose Adjustment Note    Lemuel Allen is a 66 y.o. male being treated with the medication Cefepime    Patient Data:    Vital Signs (Most Recent):  Temp: 98.2 °F (36.8 °C) (01/24/22 0400)  Pulse: 74 (01/24/22 1336)  Resp: (!) 22 (01/24/22 1336)  BP: (!) 146/89 (01/24/22 0600)  SpO2: 100 % (01/24/22 1336)   Vital Signs (72h Range):  Temp:  [97.8 °F (36.6 °C)-98.6 °F (37 °C)]   Pulse:  [62-75]   Resp:  [18-51]   BP: (105-146)/(56-89)   SpO2:  [93 %-100 %]      Recent Labs   Lab 01/23/22  0826 01/23/22  1417 01/24/22  0411   CREATININE 3.4* 2.8* 2.4*     Serum creatinine: 2.4 mg/dL (H) 01/24/22 0411  Estimated creatinine clearance: 37.9 mL/min (A)    Medication: Cefepime 1 g IV Q24h will be changed to Cefepime 1 g IV Q12h per pharmacy protocol.    Pharmacist's Name: Darline Maxwell, PharmD  Pharmacist's Extension: [phone]

## 2022-01-24 NOTE — ASSESSMENT & PLAN NOTE
Vs recurrent stasis dermatitis  - patient presents with report of worsening erythema, edema, fever and weeping to RLE x 4 days   - patient afebrile on arrival , no leukocytosis or lactic acidosis, vitals stable. Procal 1    - clindamycin an  Bactrim initiated 1/19/22, patient reports worsening symptoms despite compliance with antibiotics   -patient with similar event 11/2021, resolved with augmentin   - Venous doppler negative for DVT   - he received doxycycline in ED   - continue Vancomycin and cefepime.    - Requested evaluation by wound care and wound cultures to look for S.aureus.

## 2022-01-24 NOTE — PROGRESS NOTES
[facility] - Intensive Care ([facility])  [facility] Medicine  Progress Note    Patient Name: Lemuel Allen  MRN: [id number]  Patient Class: IP- Inpatient   Admission Date: 1/23/2022  Length of Stay: 1 days  Attending Physician: Stephen Ga MD  Primary Care Provider: Michael Fitzpatrick MD        Subjective:     Principal Problem:YULISA (acute kidney injury)        HPI:  Lemuel Allen is a 65 yo male with past medical history of hypertension, hyperlipidemia and GERD who presented to [facility] ED for evaluation of worsening right lower extremity cellulitis. Patient reports 4 day history of erythema, edema and weeping to right lower extremity. He was evaluated in ED on 1/19/22 for similar complaint. Clindamycin and Bactrim initiated for management of cellulitis. Patient and spouse at bedside reports worsening erythema and edema with fever over the past 24 hours (now resolved) despite compliance with po antibiotics. Patient denies n/v/d and abdominal pain. Of note, he reports shortness of breath exacerbated by exertion and lying flat.  Pt denies known history of COPD and CHF.     ED evaluation: VSS on arrival. Labs remarkable for BUN/Cr 72/3.4 (baseline Cr 1.2-1.4), bnp 527, ferritin 815, , procal 1. CXR shows subtle ground-glass infiltrates in both lung fields which may represent COVID pneumonia. Rapid COVID 19 negative. Venous doppler negative for DVT. He received doxycycline and 2 liters IVF. Patient admitted to hospital medicine for further evaluation.          Overview/Hospital Course:  No notes on file    Interval History:  No acute problems overnight.  Denies chest pain or shortness of breath.  Right lower leg tenderness and weeping edema.  Venous US negative for DVT.  No fevers or chills.  No sore throat, runny nose, or cough.    Review of Systems   Constitutional: Negative.  Negative for chills, fatigue and fever.   HENT: Negative.  Negative for congestion, dental problem, rhinorrhea and sore throat.    Eyes: Negative.   Negative for visual disturbance.   Respiratory: Negative.  Negative for cough, shortness of breath and wheezing.    Cardiovascular: Positive for leg swelling. Negative for chest pain and palpitations.   Gastrointestinal: Negative for abdominal pain, diarrhea, nausea and vomiting.   Endocrine: Negative.  Negative for cold intolerance and heat intolerance.   Genitourinary: Negative.  Negative for dysuria and hematuria.   Musculoskeletal: Negative.  Negative for arthralgias, gait problem and myalgias.   Skin: Positive for rash and wound.   Allergic/Immunologic: Negative.    Neurological: Negative.  Negative for dizziness, syncope, weakness, numbness and headaches.   Hematological: Negative.  Negative for adenopathy.   Psychiatric/Behavioral: Negative.  Negative for confusion and dysphoric mood.   All other systems reviewed and are negative.    Objective:     Vital Signs (Most Recent):  Temp: 98.2 °F (36.8 °C) (01/24/22 0400)  Pulse: 69 (01/24/22 0718)  Resp: (!) 29 (01/24/22 0718)  BP: (!) 146/89 (01/24/22 0600)  SpO2: 96 % (01/24/22 0718) Vital Signs (24h Range):  Temp:  [97.8 °F (36.6 °C)-98.3 °F (36.8 °C)] 98.2 °F (36.8 °C)  Pulse:  [62-75] 69  Resp:  [18-51] 29  SpO2:  [93 %-98 %] 96 %  BP: (109-146)/(56-89) 146/89     Weight: 108.1 kg (238 lb 5.1 oz)  Body mass index is 33.24 kg/m².    Intake/Output Summary (Last 24 hours) at 1/24/2022 0955  Last data filed at 1/24/2022 0600  Gross per 24 hour   Intake 2850 ml   Output 300 ml   Net 2550 ml      Physical Exam  Constitutional:       General: He is not in acute distress.     Appearance: He is well-developed and well-nourished. He is not diaphoretic.   HENT:      Head: Normocephalic and atraumatic.      Mouth/Throat:      Mouth: Oropharynx is clear and moist.   Eyes:      Extraocular Movements: EOM normal.      Conjunctiva/sclera: Conjunctivae normal.      Pupils: Pupils are equal, round, and reactive to light.   Neck:      Thyroid: No thyromegaly.      Vascular: No  JVD.   Cardiovascular:      Rate and Rhythm: Normal rate and regular rhythm.      Heart sounds: Normal heart sounds. No murmur heard.  No friction rub. No gallop.    Pulmonary:      Effort: Pulmonary effort is normal. No respiratory distress.      Breath sounds: Normal breath sounds. No wheezing or rales.   Abdominal:      General: Bowel sounds are normal. There is no distension.      Palpations: Abdomen is soft.      Tenderness: There is no abdominal tenderness. There is no guarding or rebound.   Musculoskeletal:         General: No tenderness. Normal range of motion.      Right lower leg: Edema present.   Lymphadenopathy:      Cervical: No cervical adenopathy.   Skin:     General: Skin is warm and dry.      Findings: Rash present. No erythema.      Comments: Right lower leg.  Near circumferential erythema and swelling.  Weeping edema soiling pad with yellow fluid.  Tender to palpation.   Neurological:      Mental Status: He is alert and oriented to person, place, and time.      Cranial Nerves: No cranial nerve deficit.      Deep Tendon Reflexes: Reflexes are normal and symmetric. Reflexes normal.   Psychiatric:         Mood and Affect: Mood and affect normal.         Behavior: Behavior normal.         Thought Content: Thought content normal.         Judgment: Judgment normal.         Significant Labs: All pertinent labs within the past 24 hours have been reviewed.    Significant Imaging: I have reviewed all pertinent imaging results/findings within the past 24 hours.      Assessment/Plan:      * YULISA (acute kidney injury)  Suspect prerenal but could have been caused or exacerbated by Bactrim   -CR 3.4 on admit (baseline 1.1-1.4), GFR 18   -UA unremarkable  -Urine sodium is 21.  -Avoid nephrotoxic medications   - renal dose meds   -strict intake and output   - avoid events causing decreased renal perfusion (hypotension, sepsis)     Hyperlipidemia  -continue statin     Hypertension  -stable   - continue amlodipine 5  mg daily and carvedilol 25 mg BID  - hold HCTZ and ARB 2/2 YULISA       Shortness of breath  Uncertain if there is undiagnosed heart failure.  Cardiac echo showed normal systolic and diastolic function.  Patient complains of SOB, worse with lying flat and exertion. Denies hx of CHF and COPD.  - CXR concerning for COVID 19 vs congestion; ferritin 815, , .7.  BNP very high at 527.  Patient denies symptoms that would suggest a respiratory viral syndrome.  - rapid COVID 19 negative, will check PCR and inflammatory markers  - gentle hydration, monitor for signs of volume overload     Cellulitis of right lower extremity  Vs recurrent stasis dermatitis  - patient presents with report of worsening erythema, edema, fever and weeping to RLE x 4 days   - patient afebrile on arrival , no leukocytosis or lactic acidosis, vitals stable. Procal 1    - clindamycin an  Bactrim initiated 1/19/22, patient reports worsening symptoms despite compliance with antibiotics   -patient with similar event 11/2021, resolved with augmentin   - Venous doppler negative for DVT   - he received doxycycline in ED   - continue Vancomycin and cefepime.    - Requested evaluation by wound care and wound cultures to look for S.aureus.        VTE Risk Mitigation (From admission, onward)         Ordered     heparin (porcine) injection 5,000 Units  Every 8 hours         01/23/22 1056     IP VTE HIGH RISK PATIENT  Once         01/23/22 1056     Place sequential compression device  Until discontinued         01/23/22 1056                Discharge Planning   JUSTIN:      Code Status: Full Code   Is the patient medically ready for discharge?:     Reason for patient still in hospital (select all that apply): Patient trending condition, Treatment and Consult recommendations         Stephen Ga MD  Department of Hospital Medicine   [facility] - Intensive Care ([facility])

## 2022-01-24 NOTE — SUBJECTIVE & OBJECTIVE
Interval History:  No acute problems overnight.  Denies chest pain or shortness of breath.  Right lower leg tenderness and weeping edema.  Venous US negative for DVT.  No fevers or chills.  No sore throat, runny nose, or cough.    Review of Systems   Constitutional: Negative.  Negative for chills, fatigue and fever.   HENT: Negative.  Negative for congestion, dental problem, rhinorrhea and sore throat.    Eyes: Negative.  Negative for visual disturbance.   Respiratory: Negative.  Negative for cough, shortness of breath and wheezing.    Cardiovascular: Positive for leg swelling. Negative for chest pain and palpitations.   Gastrointestinal: Negative for abdominal pain, diarrhea, nausea and vomiting.   Endocrine: Negative.  Negative for cold intolerance and heat intolerance.   Genitourinary: Negative.  Negative for dysuria and hematuria.   Musculoskeletal: Negative.  Negative for arthralgias, gait problem and myalgias.   Skin: Positive for rash and wound.   Allergic/Immunologic: Negative.    Neurological: Negative.  Negative for dizziness, syncope, weakness, numbness and headaches.   Hematological: Negative.  Negative for adenopathy.   Psychiatric/Behavioral: Negative.  Negative for confusion and dysphoric mood.   All other systems reviewed and are negative.    Objective:     Vital Signs (Most Recent):  Temp: 98.2 °F (36.8 °C) (01/24/22 0400)  Pulse: 69 (01/24/22 0718)  Resp: (!) 29 (01/24/22 0718)  BP: (!) 146/89 (01/24/22 0600)  SpO2: 96 % (01/24/22 0718) Vital Signs (24h Range):  Temp:  [97.8 °F (36.6 °C)-98.3 °F (36.8 °C)] 98.2 °F (36.8 °C)  Pulse:  [62-75] 69  Resp:  [18-51] 29  SpO2:  [93 %-98 %] 96 %  BP: (109-146)/(56-89) 146/89     Weight: 108.1 kg (238 lb 5.1 oz)  Body mass index is 33.24 kg/m².    Intake/Output Summary (Last 24 hours) at 1/24/2022 0955  Last data filed at 1/24/2022 0600  Gross per 24 hour   Intake 2850 ml   Output 300 ml   Net 2550 ml      Physical Exam  Constitutional:       General: He is  not in acute distress.     Appearance: He is well-developed and well-nourished. He is not diaphoretic.   HENT:      Head: Normocephalic and atraumatic.      Mouth/Throat:      Mouth: Oropharynx is clear and moist.   Eyes:      Extraocular Movements: EOM normal.      Conjunctiva/sclera: Conjunctivae normal.      Pupils: Pupils are equal, round, and reactive to light.   Neck:      Thyroid: No thyromegaly.      Vascular: No JVD.   Cardiovascular:      Rate and Rhythm: Normal rate and regular rhythm.      Heart sounds: Normal heart sounds. No murmur heard.  No friction rub. No gallop.    Pulmonary:      Effort: Pulmonary effort is normal. No respiratory distress.      Breath sounds: Normal breath sounds. No wheezing or rales.   Abdominal:      General: Bowel sounds are normal. There is no distension.      Palpations: Abdomen is soft.      Tenderness: There is no abdominal tenderness. There is no guarding or rebound.   Musculoskeletal:         General: No tenderness. Normal range of motion.      Right lower leg: Edema present.   Lymphadenopathy:      Cervical: No cervical adenopathy.   Skin:     General: Skin is warm and dry.      Findings: Rash present. No erythema.      Comments: Right lower leg.  Near circumferential erythema and swelling.  Weeping edema soiling pad with yellow fluid.  Tender to palpation.   Neurological:      Mental Status: He is alert and oriented to person, place, and time.      Cranial Nerves: No cranial nerve deficit.      Deep Tendon Reflexes: Reflexes are normal and symmetric. Reflexes normal.   Psychiatric:         Mood and Affect: Mood and affect normal.         Behavior: Behavior normal.         Thought Content: Thought content normal.         Judgment: Judgment normal.         Significant Labs: All pertinent labs within the past 24 hours have been reviewed.    Significant Imaging: I have reviewed all pertinent imaging results/findings within the past 24 hours.

## 2022-01-25 LAB
ALBUMIN SERPL BCP-MCNC: 2.5 G/DL (ref 3.5–5.2)
ALBUMIN SERPL BCP-MCNC: 2.5 G/DL (ref 3.5–5.2)
ALP SERPL-CCNC: 227 U/L (ref 55–135)
ALT SERPL W/O P-5'-P-CCNC: 35 U/L (ref 10–44)
ANION GAP SERPL CALC-SCNC: 11 MMOL/L (ref 8–16)
ANION GAP SERPL CALC-SCNC: 11 MMOL/L (ref 8–16)
ANISOCYTOSIS BLD QL SMEAR: SLIGHT
AST SERPL-CCNC: 43 U/L (ref 10–40)
BASOPHILS NFR BLD: 0 % (ref 0–1.9)
BILIRUB SERPL-MCNC: 0.6 MG/DL (ref 0.1–1)
BUN SERPL-MCNC: 45 MG/DL (ref 8–23)
BUN SERPL-MCNC: 46 MG/DL (ref 8–23)
CALCIUM SERPL-MCNC: 9.2 MG/DL (ref 8.7–10.5)
CALCIUM SERPL-MCNC: 9.2 MG/DL (ref 8.7–10.5)
CHLORIDE SERPL-SCNC: 104 MMOL/L (ref 95–110)
CHLORIDE SERPL-SCNC: 105 MMOL/L (ref 95–110)
CO2 SERPL-SCNC: 24 MMOL/L (ref 23–29)
CO2 SERPL-SCNC: 25 MMOL/L (ref 23–29)
CREAT SERPL-MCNC: 1.7 MG/DL (ref 0.5–1.4)
CREAT SERPL-MCNC: 1.7 MG/DL (ref 0.5–1.4)
DIFFERENTIAL METHOD: ABNORMAL
EOSINOPHIL NFR BLD: 1 % (ref 0–8)
ERYTHROCYTE [DISTWIDTH] IN BLOOD BY AUTOMATED COUNT: 14.8 % (ref 11.5–14.5)
EST. GFR  (AFRICAN AMERICAN): 48 ML/MIN/1.73 M^2
EST. GFR  (AFRICAN AMERICAN): 48 ML/MIN/1.73 M^2
EST. GFR  (NON AFRICAN AMERICAN): 41 ML/MIN/1.73 M^2
EST. GFR  (NON AFRICAN AMERICAN): 41 ML/MIN/1.73 M^2
GLUCOSE SERPL-MCNC: 101 MG/DL (ref 70–110)
GLUCOSE SERPL-MCNC: 102 MG/DL (ref 70–110)
HCT VFR BLD AUTO: 31.1 % (ref 40–54)
HGB BLD-MCNC: 10.1 G/DL (ref 14–18)
HYPOCHROMIA BLD QL SMEAR: ABNORMAL
IMM GRANULOCYTES # BLD AUTO: ABNORMAL K/UL (ref 0–0.04)
IMM GRANULOCYTES NFR BLD AUTO: ABNORMAL % (ref 0–0.5)
LYMPHOCYTES NFR BLD: 19 % (ref 18–48)
MCH RBC QN AUTO: 29.7 PG (ref 27–31)
MCHC RBC AUTO-ENTMCNC: 32.5 G/DL (ref 32–36)
MCV RBC AUTO: 92 FL (ref 82–98)
METAMYELOCYTES NFR BLD MANUAL: 1 %
MONOCYTES NFR BLD: 4 % (ref 4–15)
MYELOCYTES NFR BLD MANUAL: 2 %
NEUTROPHILS NFR BLD: 73 % (ref 38–73)
NRBC BLD-RTO: 0 /100 WBC
OVALOCYTES BLD QL SMEAR: ABNORMAL
PHOSPHATE SERPL-MCNC: 2.2 MG/DL (ref 2.7–4.5)
PLATELET # BLD AUTO: 220 K/UL (ref 150–450)
PLATELET BLD QL SMEAR: ABNORMAL
PMV BLD AUTO: 9.9 FL (ref 9.2–12.9)
POIKILOCYTOSIS BLD QL SMEAR: SLIGHT
POTASSIUM SERPL-SCNC: 4.2 MMOL/L (ref 3.5–5.1)
POTASSIUM SERPL-SCNC: 4.5 MMOL/L (ref 3.5–5.1)
PROT SERPL-MCNC: 6.9 G/DL (ref 6–8.4)
RBC # BLD AUTO: 3.4 M/UL (ref 4.6–6.2)
SARS-COV-2 RNA RESP QL NAA+PROBE: NOT DETECTED
SARS-COV-2- CYCLE NUMBER: NORMAL
SODIUM SERPL-SCNC: 140 MMOL/L (ref 136–145)
SODIUM SERPL-SCNC: 140 MMOL/L (ref 136–145)
VANCOMYCIN SERPL-MCNC: 16.3 UG/ML
WBC # BLD AUTO: 9.29 K/UL (ref 3.9–12.7)

## 2022-01-25 PROCEDURE — 85007 BL SMEAR W/DIFF WBC COUNT: CPT | Performed by: NURSE PRACTITIONER

## 2022-01-25 PROCEDURE — 80069 RENAL FUNCTION PANEL: CPT | Performed by: INTERNAL MEDICINE

## 2022-01-25 PROCEDURE — 36415 COLL VENOUS BLD VENIPUNCTURE: CPT | Performed by: INTERNAL MEDICINE

## 2022-01-25 PROCEDURE — 20000000 HC ICU ROOM

## 2022-01-25 PROCEDURE — 63600175 PHARM REV CODE 636 W HCPCS: Performed by: INTERNAL MEDICINE

## 2022-01-25 PROCEDURE — 94640 AIRWAY INHALATION TREATMENT: CPT

## 2022-01-25 PROCEDURE — 25000242 PHARM REV CODE 250 ALT 637 W/ HCPCS: Performed by: NURSE PRACTITIONER

## 2022-01-25 PROCEDURE — 25000003 PHARM REV CODE 250: Performed by: INTERNAL MEDICINE

## 2022-01-25 PROCEDURE — 85027 COMPLETE CBC AUTOMATED: CPT | Performed by: NURSE PRACTITIONER

## 2022-01-25 PROCEDURE — 80202 ASSAY OF VANCOMYCIN: CPT | Performed by: INTERNAL MEDICINE

## 2022-01-25 PROCEDURE — 25000242 PHARM REV CODE 250 ALT 637 W/ HCPCS: Performed by: INTERNAL MEDICINE

## 2022-01-25 PROCEDURE — 25000003 PHARM REV CODE 250: Performed by: NURSE PRACTITIONER

## 2022-01-25 PROCEDURE — 80053 COMPREHEN METABOLIC PANEL: CPT | Performed by: NURSE PRACTITIONER

## 2022-01-25 PROCEDURE — 63600175 PHARM REV CODE 636 W HCPCS: Performed by: NURSE PRACTITIONER

## 2022-01-25 RX ADMIN — SENNOSIDES AND DOCUSATE SODIUM 1 TABLET: 50; 8.6 TABLET ORAL at 09:01

## 2022-01-25 RX ADMIN — CEFEPIME HYDROCHLORIDE 1 G: 1 INJECTION, SOLUTION INTRAVENOUS at 02:01

## 2022-01-25 RX ADMIN — SENNOSIDES AND DOCUSATE SODIUM 1 TABLET: 50; 8.6 TABLET ORAL at 08:01

## 2022-01-25 RX ADMIN — HEPARIN SODIUM 5000 UNITS: 5000 INJECTION INTRAVENOUS; SUBCUTANEOUS at 05:01

## 2022-01-25 RX ADMIN — HEPARIN SODIUM 5000 UNITS: 5000 INJECTION INTRAVENOUS; SUBCUTANEOUS at 02:01

## 2022-01-25 RX ADMIN — ATORVASTATIN CALCIUM 40 MG: 40 TABLET, FILM COATED ORAL at 09:01

## 2022-01-25 RX ADMIN — CARVEDILOL 25 MG: 12.5 TABLET, FILM COATED ORAL at 08:01

## 2022-01-25 RX ADMIN — FENOFIBRATE 160 MG: 160 TABLET ORAL at 08:01

## 2022-01-25 RX ADMIN — VANCOMYCIN HYDROCHLORIDE 1500 MG: 1.5 INJECTION, POWDER, LYOPHILIZED, FOR SOLUTION INTRAVENOUS at 10:01

## 2022-01-25 RX ADMIN — PANTOPRAZOLE SODIUM 40 MG: 40 TABLET, DELAYED RELEASE ORAL at 08:01

## 2022-01-25 RX ADMIN — AMLODIPINE BESYLATE 5 MG: 5 TABLET ORAL at 08:01

## 2022-01-25 RX ADMIN — ASPIRIN 81 MG: 81 TABLET, COATED ORAL at 08:01

## 2022-01-25 RX ADMIN — ALBUTEROL SULFATE 2.5 MG: 2.5 SOLUTION RESPIRATORY (INHALATION) at 07:01

## 2022-01-25 RX ADMIN — ALBUTEROL SULFATE 2.5 MG: 2.5 SOLUTION RESPIRATORY (INHALATION) at 01:01

## 2022-01-25 RX ADMIN — CARVEDILOL 25 MG: 12.5 TABLET, FILM COATED ORAL at 09:01

## 2022-01-25 RX ADMIN — ALBUTEROL SULFATE 2.5 MG: 2.5 SOLUTION RESPIRATORY (INHALATION) at 12:01

## 2022-01-25 RX ADMIN — HEPARIN SODIUM 5000 UNITS: 5000 INJECTION INTRAVENOUS; SUBCUTANEOUS at 09:01

## 2022-01-25 NOTE — PROGRESS NOTES
Pharmacokinetic Assessment Follow Up: IV Vancomycin    Vancomycin serum concentration assessment(s):    The random level was drawn correctly and can be used to guide therapy at this time. The measurement is above the desired definitive target range of 10 to 15 mcg/mL.    Vancomycin Regimen Plan:    Will continue pulse dose regimen with one time dose of 1500 mg (15 mg/kg). Will delay the dose to 1000 to allow some clearance of vancomycin to therapeutic range.     Re-dose when the random level is less than 15 mcg/mL, next level to be drawn at 0400 on 1/26    Drug levels (last 3 results):  Recent Labs   Lab Result Units 01/24/22  1322 01/25/22  0410   Vancomycin, Random ug/mL 9.7 16.3       Pharmacy will continue to follow and monitor vancomycin.    Please contact pharmacy at extension [phone] for questions regarding this assessment.    Thank you for the consult,   Laura Walker       Patient brief summary:  Lemuel Allen is a 66 y.o. male initiated on antimicrobial therapy with IV Vancomycin for treatment of skin & soft tissue infection    The patient's current regimen is [id number]    Drug Allergies:   Review of patient's allergies indicates:   Allergen Reactions    Betamethasone Swelling    Prednisone        Actual Body Weight:   108.1 kg    Renal Function:   Estimated Creatinine Clearance: 53.4 mL/min (A) (based on SCr of 1.7 mg/dL (H)).,     Dialysis Method (if applicable):  N/A    CBC (last 72 hours):  Recent Labs   Lab Result Units 01/23/22  0826 01/24/22  0411 01/25/22  0410   WBC K/uL 8.70 8.74 9.29   Hemoglobin g/dL 10.2* 9.9* 10.1*   Hematocrit % 31.5* 31.0* 31.1*   Platelets K/uL 140* 148* 220   Gran % % 86.7* 80.8*  --    Lymph % % 6.0* 9.6*  --    Mono % % 3.8* 5.7  --    Eosinophil % % 1.8 1.6  --    Basophil % % 0.2 0.2  --    Differential Method  Automated Automated  --        Metabolic Panel (last 72 hours):  Recent Labs   Lab Result Units 01/23/22  0826 01/23/22  1140 01/23/22  1417 01/24/22  0411  01/25/22  0410   Sodium mmol/L 134*  --  137 135* 140  140   Sodium, Urine mmol/L  --  21  --   --   --    Potassium mmol/L 3.8  --  4.3 4.1 4.2  4.5   Chloride mmol/L 100  --  105 103 104  105   CO2 mmol/L 22*  --  20* 19* 25  24   Glucose mg/dL 108  --  104 95 101  102   Glucose, UA   --  Negative  --   --   --    BUN mg/dL 72*  --  64* 66* 46*  45*   Creatinine mg/dL 3.4*  --  2.8* 2.4* 1.7*  1.7*   Albumin g/dL 2.5*  --   --  2.4* 2.5*  2.5*   Total Bilirubin mg/dL 0.5  --   --  0.5 0.6   Alkaline Phosphatase U/L 220*  --   --  243* 227*   AST U/L 47*  --   --  46* 43*   ALT U/L 33  --   --  34 35   Phosphorus mg/dL  --   --   --   --  2.2*       Vancomycin Administrations:  vancomycin given in the last 96 hours                     vancomycin 1.5 g in dextrose 5 % 250 mL IVPB (ready to mix) (mg) 1,500 mg New Bag 01/24/22 1645    vancomycin 2 g in dextrose 5 % 500 mL IVPB (mg) 2,000 mg New Bag 01/23/22 1422                    Microbiologic Results:  Microbiology Results (last 7 days)       Procedure Component Value Units Date/Time    Culture, Anaerobe [[id number]] Collected: 01/24/22 1540    Order Status: Sent Specimen: Wound from Leg, Right Updated: 01/25/22 0144    Aerobic culture [[id number]] Collected: 01/24/22 1540    Order Status: Sent Specimen: Wound from Leg, Right Updated: 01/25/22 0144    Blood culture #1 **CANNOT BE ORDERED STAT** [[id number]] Collected: 01/23/22 0836    Order Status: Completed Specimen: Blood from Peripheral, Forearm, Left Updated: 01/24/22 1612     Blood Culture, Routine No Growth to date      No Growth to date    Blood culture #2 **CANNOT BE ORDERED STAT** [[id number]] Collected: 01/23/22 0836    Order Status: Completed Specimen: Blood from Peripheral, Antecubital, Left Updated: 01/24/22 1612     Blood Culture, Routine No Growth to date      No Growth to date

## 2022-01-26 VITALS
RESPIRATION RATE: 16 BRPM | HEART RATE: 75 BPM | DIASTOLIC BLOOD PRESSURE: 77 MMHG | OXYGEN SATURATION: 97 % | BODY MASS INDEX: 33.55 KG/M2 | WEIGHT: 239.63 LBS | SYSTOLIC BLOOD PRESSURE: 142 MMHG | TEMPERATURE: 98 F | HEIGHT: 71 IN

## 2022-01-26 LAB
ALBUMIN SERPL BCP-MCNC: 2.4 G/DL (ref 3.5–5.2)
ALP SERPL-CCNC: 190 U/L (ref 55–135)
ALT SERPL W/O P-5'-P-CCNC: 32 U/L (ref 10–44)
ANION GAP SERPL CALC-SCNC: 9 MMOL/L (ref 8–16)
ANISOCYTOSIS BLD QL SMEAR: SLIGHT
AST SERPL-CCNC: 34 U/L (ref 10–40)
BASOPHILS # BLD AUTO: ABNORMAL K/UL (ref 0–0.2)
BASOPHILS NFR BLD: 0 % (ref 0–1.9)
BILIRUB SERPL-MCNC: 0.5 MG/DL (ref 0.1–1)
BUN SERPL-MCNC: 29 MG/DL (ref 8–23)
CALCIUM SERPL-MCNC: 8.8 MG/DL (ref 8.7–10.5)
CHLORIDE SERPL-SCNC: 104 MMOL/L (ref 95–110)
CO2 SERPL-SCNC: 25 MMOL/L (ref 23–29)
CREAT SERPL-MCNC: 1.2 MG/DL (ref 0.5–1.4)
CRP SERPL-MCNC: 68.6 MG/L (ref 0–8.2)
DIFFERENTIAL METHOD: ABNORMAL
EOSINOPHIL # BLD AUTO: ABNORMAL K/UL (ref 0–0.5)
EOSINOPHIL NFR BLD: 0 % (ref 0–8)
ERYTHROCYTE [DISTWIDTH] IN BLOOD BY AUTOMATED COUNT: 14.5 % (ref 11.5–14.5)
EST. GFR  (AFRICAN AMERICAN): >60 ML/MIN/1.73 M^2
EST. GFR  (NON AFRICAN AMERICAN): >60 ML/MIN/1.73 M^2
GLUCOSE SERPL-MCNC: 106 MG/DL (ref 70–110)
HCT VFR BLD AUTO: 30.5 % (ref 40–54)
HGB BLD-MCNC: 9.8 G/DL (ref 14–18)
HYPOCHROMIA BLD QL SMEAR: ABNORMAL
IMM GRANULOCYTES # BLD AUTO: ABNORMAL K/UL (ref 0–0.04)
IMM GRANULOCYTES NFR BLD AUTO: ABNORMAL % (ref 0–0.5)
LYMPHOCYTES # BLD AUTO: ABNORMAL K/UL (ref 1–4.8)
LYMPHOCYTES NFR BLD: 16 % (ref 18–48)
MCH RBC QN AUTO: 30.1 PG (ref 27–31)
MCHC RBC AUTO-ENTMCNC: 32.1 G/DL (ref 32–36)
MCV RBC AUTO: 94 FL (ref 82–98)
METAMYELOCYTES NFR BLD MANUAL: 5 %
MONOCYTES # BLD AUTO: ABNORMAL K/UL (ref 0.3–1)
MONOCYTES NFR BLD: 1 % (ref 4–15)
MYELOCYTES NFR BLD MANUAL: 2 %
NEUTROPHILS NFR BLD: 75 % (ref 38–73)
NEUTS BAND NFR BLD MANUAL: 1 %
NRBC BLD-RTO: 0 /100 WBC
OVALOCYTES BLD QL SMEAR: ABNORMAL
PLATELET # BLD AUTO: 245 K/UL (ref 150–450)
PLATELET BLD QL SMEAR: ABNORMAL
PMV BLD AUTO: 9.5 FL (ref 9.2–12.9)
POIKILOCYTOSIS BLD QL SMEAR: SLIGHT
POTASSIUM SERPL-SCNC: 4 MMOL/L (ref 3.5–5.1)
PROT SERPL-MCNC: 6.5 G/DL (ref 6–8.4)
RBC # BLD AUTO: 3.26 M/UL (ref 4.6–6.2)
SODIUM SERPL-SCNC: 138 MMOL/L (ref 136–145)
VANCOMYCIN SERPL-MCNC: 12.3 UG/ML
WBC # BLD AUTO: 8.52 K/UL (ref 3.9–12.7)

## 2022-01-26 PROCEDURE — 86140 C-REACTIVE PROTEIN: CPT | Performed by: INTERNAL MEDICINE

## 2022-01-26 PROCEDURE — 63600175 PHARM REV CODE 636 W HCPCS: Performed by: INTERNAL MEDICINE

## 2022-01-26 PROCEDURE — 25000242 PHARM REV CODE 250 ALT 637 W/ HCPCS: Performed by: INTERNAL MEDICINE

## 2022-01-26 PROCEDURE — 80202 ASSAY OF VANCOMYCIN: CPT | Performed by: INTERNAL MEDICINE

## 2022-01-26 PROCEDURE — 63600175 PHARM REV CODE 636 W HCPCS: Performed by: NURSE PRACTITIONER

## 2022-01-26 PROCEDURE — 85007 BL SMEAR W/DIFF WBC COUNT: CPT | Performed by: NURSE PRACTITIONER

## 2022-01-26 PROCEDURE — 80053 COMPREHEN METABOLIC PANEL: CPT | Performed by: NURSE PRACTITIONER

## 2022-01-26 PROCEDURE — 25000003 PHARM REV CODE 250: Performed by: NURSE PRACTITIONER

## 2022-01-26 PROCEDURE — 25000003 PHARM REV CODE 250: Performed by: INTERNAL MEDICINE

## 2022-01-26 PROCEDURE — 85027 COMPLETE CBC AUTOMATED: CPT | Performed by: NURSE PRACTITIONER

## 2022-01-26 PROCEDURE — 25000242 PHARM REV CODE 250 ALT 637 W/ HCPCS: Performed by: NURSE PRACTITIONER

## 2022-01-26 PROCEDURE — 94640 AIRWAY INHALATION TREATMENT: CPT

## 2022-01-26 RX ORDER — AMOXICILLIN AND CLAVULANATE POTASSIUM 875; 125 MG/1; MG/1
1 TABLET, FILM COATED ORAL EVERY 12 HOURS
Qty: 14 TABLET | Refills: 0 | Status: SHIPPED | OUTPATIENT
Start: 2022-01-26 | End: 2022-02-02

## 2022-01-26 RX ORDER — FUROSEMIDE 10 MG/ML
80 INJECTION INTRAMUSCULAR; INTRAVENOUS ONCE
Status: COMPLETED | OUTPATIENT
Start: 2022-01-26 | End: 2022-01-26

## 2022-01-26 RX ORDER — CEFEPIME HYDROCHLORIDE 1 G/50ML
1 INJECTION, SOLUTION INTRAVENOUS
Status: DISCONTINUED | OUTPATIENT
Start: 2022-01-26 | End: 2022-01-26 | Stop reason: HOSPADM

## 2022-01-26 RX ORDER — DOXYCYCLINE HYCLATE 100 MG
100 TABLET ORAL EVERY 12 HOURS
Qty: 14 TABLET | Refills: 0 | Status: SHIPPED | OUTPATIENT
Start: 2022-01-26 | End: 2022-02-02

## 2022-01-26 RX ADMIN — ALBUTEROL SULFATE 2.5 MG: 2.5 SOLUTION RESPIRATORY (INHALATION) at 01:01

## 2022-01-26 RX ADMIN — CEFEPIME HYDROCHLORIDE 1 G: 1 INJECTION, SOLUTION INTRAVENOUS at 01:01

## 2022-01-26 RX ADMIN — ALBUTEROL SULFATE 2.5 MG: 2.5 SOLUTION RESPIRATORY (INHALATION) at 07:01

## 2022-01-26 RX ADMIN — HEPARIN SODIUM 5000 UNITS: 5000 INJECTION INTRAVENOUS; SUBCUTANEOUS at 05:01

## 2022-01-26 RX ADMIN — FUROSEMIDE 80 MG: 10 INJECTION, SOLUTION INTRAMUSCULAR; INTRAVENOUS at 10:01

## 2022-01-26 RX ADMIN — AMLODIPINE BESYLATE 5 MG: 5 TABLET ORAL at 08:01

## 2022-01-26 RX ADMIN — PANTOPRAZOLE SODIUM 40 MG: 40 TABLET, DELAYED RELEASE ORAL at 08:01

## 2022-01-26 RX ADMIN — VANCOMYCIN HYDROCHLORIDE 1250 MG: 1.25 INJECTION, POWDER, LYOPHILIZED, FOR SOLUTION INTRAVENOUS at 06:01

## 2022-01-26 RX ADMIN — CARVEDILOL 25 MG: 12.5 TABLET, FILM COATED ORAL at 08:01

## 2022-01-26 RX ADMIN — FENOFIBRATE 160 MG: 160 TABLET ORAL at 08:01

## 2022-01-26 RX ADMIN — ASPIRIN 81 MG: 81 TABLET, COATED ORAL at 08:01

## 2022-01-26 NOTE — PROGRESS NOTES
Pharmacist Renal Dose Adjustment Note    Lemuel Allen is a 66 y.o. male being treated with the medication Cefepime    Patient Data:    Vital Signs (Most Recent):  Temp: 98.6 °F (37 °C) (01/26/22 0730)  Pulse: 69 (01/26/22 1030)  Resp: (!) 33 (01/26/22 1030)  BP: (!) 157/82 (01/26/22 0605)  SpO2: 98 % (01/26/22 0727)   Vital Signs (72h Range):  Temp:  [96.8 °F (36 °C)-99.5 °F (37.5 °C)]   Pulse:  [62-83]   Resp:  [14-51]   BP: (109-163)/(56-89)   SpO2:  [92 %-100 %]      Recent Labs   Lab 01/24/22  0411 01/25/22  0410 01/26/22  0335   CREATININE 2.4* 1.7*  1.7* 1.2     Serum creatinine: 1.2 mg/dL 01/26/22 0335  Estimated creatinine clearance: 76 mL/min    Medication: Cefepime 1 g IV Q12h will be changed to Cefepime 1 g IV Q8h per pharmacy protocol.    Pharmacist's Name: Darline Maxwell, PharmD  Pharmacist's Extension: [phone]

## 2022-01-26 NOTE — PLAN OF CARE
01/26/22 1458   Post-Acute Status   Post-Acute Authorization Home Health   Home Health Status Set-up Complete/Auth obtained   Coverage [employer]   Discharge Plan   Discharge Plan A Home Health  ([facility] )     Spoke with patient about discharging with home health. Obtained choices, referral sent through [facility]. [facility] accepted patient.

## 2022-01-26 NOTE — PROGRESS NOTES
[facility] - [facility] Care ([facility])  [facility] Medicine  Progress Note    Patient Name: Lemuel Allen  MRN: [id number]  Patient Class: IP- Inpatient   Admission Date: 1/23/2022  Length of Stay: 2 days  Attending Physician: Stephen Ga MD  Primary Care Provider: Michael Fitzpatrick MD        Subjective:     Principal Problem:YULISA (acute kidney injury)        HPI:  Lemuel Allen is a 67 yo male with past medical history of hypertension, hyperlipidemia and GERD who presented to [facility] ED for evaluation of worsening right lower extremity cellulitis. Patient reports 4 day history of erythema, edema and weeping to right lower extremity. He was evaluated in ED on 1/19/22 for similar complaint. Clindamycin and Bactrim initiated for management of cellulitis. Patient and spouse at bedside reports worsening erythema and edema with fever over the past 24 hours (now resolved) despite compliance with po antibiotics. Patient denies n/v/d and abdominal pain. Of note, he reports shortness of breath exacerbated by exertion and lying flat.  Pt denies known history of COPD and CHF.     ED evaluation: VSS on arrival. Labs remarkable for BUN/Cr 72/3.4 (baseline Cr 1.2-1.4), bnp 527, ferritin 815, , procal 1. CXR shows subtle ground-glass infiltrates in both lung fields which may represent COVID pneumonia. Rapid COVID 19 negative. Venous doppler negative for DVT. He received doxycycline and 2 liters IVF. Patient admitted to hospital medicine for further evaluation.          Overview/Hospital Course:  No notes on file    Interval History:  No acute problems overnight.  Slight improvement to swelling of the left leg.  Wounds no longer weeping and renal function chemistries improved.    Review of Systems   Constitutional: Negative.  Negative for chills, fatigue and fever.   HENT: Negative.  Negative for congestion, dental problem, rhinorrhea and sore throat.    Eyes: Negative.  Negative for visual disturbance.   Respiratory: Negative.   Negative for cough, shortness of breath and wheezing.    Cardiovascular: Positive for leg swelling. Negative for chest pain and palpitations.   Gastrointestinal: Negative for abdominal pain, diarrhea, nausea and vomiting.   Endocrine: Negative.  Negative for cold intolerance and heat intolerance.   Genitourinary: Negative.  Negative for dysuria and hematuria.   Musculoskeletal: Negative.  Negative for arthralgias, gait problem and myalgias.   Skin: Positive for rash and wound.   Allergic/Immunologic: Negative.    Neurological: Negative.  Negative for dizziness, syncope, weakness, numbness and headaches.   Hematological: Negative.  Negative for adenopathy.   Psychiatric/Behavioral: Negative.  Negative for confusion and dysphoric mood.   All other systems reviewed and are negative.    Objective:     Vital Signs (Most Recent):  Temp: 98.9 °F (37.2 °C) (01/25/22 1530)  Pulse: 81 (01/25/22 1745)  Resp: (!) 33 (01/25/22 1745)  BP: 133/67 (01/25/22 1115)  SpO2: 99 % (01/25/22 1257) Vital Signs (24h Range):  Temp:  [98.4 °F (36.9 °C)-99.4 °F (37.4 °C)] 98.9 °F (37.2 °C)  Pulse:  [66-83] 81  Resp:  [14-43] 33  SpO2:  [92 %-99 %] 99 %  BP: (120-163)/(60-79) 133/67     Weight: 108.1 kg (238 lb 5.1 oz)  Body mass index is 33.24 kg/m².    Intake/Output Summary (Last 24 hours) at 1/25/2022 1817  Last data filed at 1/25/2022 1800  Gross per 24 hour   Intake 1063.11 ml   Output 1850 ml   Net -786.89 ml      Physical Exam  Constitutional:       General: He is not in acute distress.     Appearance: He is well-developed. He is not diaphoretic.   HENT:      Head: Normocephalic and atraumatic.   Eyes:      Conjunctiva/sclera: Conjunctivae normal.      Pupils: Pupils are equal, round, and reactive to light.   Neck:      Thyroid: No thyromegaly.      Vascular: No JVD.   Cardiovascular:      Rate and Rhythm: Normal rate and regular rhythm.      Heart sounds: Normal heart sounds. No murmur heard.  No friction rub. No gallop.    Pulmonary:       Effort: Pulmonary effort is normal. No respiratory distress.      Breath sounds: Normal breath sounds. No wheezing or rales.   Abdominal:      General: Bowel sounds are normal. There is no distension.      Palpations: Abdomen is soft.      Tenderness: There is no abdominal tenderness. There is no guarding or rebound.   Musculoskeletal:         General: No tenderness. Normal range of motion.      Right lower leg: Edema present.   Lymphadenopathy:      Cervical: No cervical adenopathy.   Skin:     General: Skin is warm and dry.      Findings: Rash present. No erythema.      Comments: Right lower leg.  Circumferential erythema and marked swelling.  Wounds no longer weeping.  Tender to palpation.   Neurological:      Mental Status: He is alert and oriented to person, place, and time.      Cranial Nerves: No cranial nerve deficit.      Deep Tendon Reflexes: Reflexes are normal and symmetric. Reflexes normal.   Psychiatric:         Behavior: Behavior normal.         Thought Content: Thought content normal.         Judgment: Judgment normal.         Significant Labs: All pertinent labs within the past 24 hours have been reviewed.    Significant Imaging: I have reviewed all pertinent imaging results/findings within the past 24 hours.      Assessment/Plan:      * YULISA (acute kidney injury)  Suspect prerenal but could have been caused or exacerbated by Bactrim   -CR 3.4 on admit (baseline 1.1-1.4), GFR 18   -UA unremarkable  -Urine sodium is 21.  -Avoid nephrotoxic medications   - renal dose meds   -strict intake and output   - avoid events causing decreased renal perfusion (hypotension, sepsis)     Hyperlipidemia  -continue statin     Hypertension  -stable   - continue amlodipine 5 mg daily and carvedilol 25 mg BID  - hold HCTZ and ARB 2/2 YULISA       Shortness of breath  Uncertain if there is undiagnosed heart failure.  Cardiac echo showed normal systolic and diastolic function.  Patient complains of SOB, worse with lying  flat and exertion. Denies hx of CHF and COPD.  - CXR concerning for COVID 19 vs congestion; ferritin 815, , .7.  BNP very high at 527.  Patient denies symptoms that would suggest a respiratory viral syndrome.  - rapid COVID 19 negative, will check PCR and inflammatory markers  - gentle hydration, monitor for signs of volume overload     Cellulitis of right lower extremity  Vs recurrent stasis dermatitis  - patient presents with report of worsening erythema, edema, fever and weeping to RLE x 4 days   - patient afebrile on arrival , no leukocytosis or lactic acidosis, vitals stable. Procal 1    - clindamycin an  Bactrim initiated 1/19/22, patient reports worsening symptoms despite compliance with antibiotics   -patient with similar event 11/2021, resolved with augmentin   - Venous doppler negative for DVT   - he received doxycycline in ED   - continue Vancomycin and cefepime.    - Requested evaluation by wound care and wound cultures to look for S.aureus.        VTE Risk Mitigation (From admission, onward)         Ordered     heparin (porcine) injection 5,000 Units  Every 8 hours         01/23/22 1056     IP VTE HIGH RISK PATIENT  Once         01/23/22 1056     Place sequential compression device  Until discontinued         01/23/22 1056                Discharge Planning   JUSTIN:      Code Status: Full Code   Is the patient medically ready for discharge?:     Reason for patient still in hospital (select all that apply): Treatment  Discharge Plan A: Home with family        Stephen Ga MD  Department of Hospital Medicine   [facility] - Intensive Care ([facility])

## 2022-01-26 NOTE — SUBJECTIVE & OBJECTIVE
Interval History:  No acute problems overnight.  Slight improvement to swelling of the left leg.  Wounds no longer weeping and renal function chemistries improved.    Review of Systems   Constitutional: Negative.  Negative for chills, fatigue and fever.   HENT: Negative.  Negative for congestion, dental problem, rhinorrhea and sore throat.    Eyes: Negative.  Negative for visual disturbance.   Respiratory: Negative.  Negative for cough, shortness of breath and wheezing.    Cardiovascular: Positive for leg swelling. Negative for chest pain and palpitations.   Gastrointestinal: Negative for abdominal pain, diarrhea, nausea and vomiting.   Endocrine: Negative.  Negative for cold intolerance and heat intolerance.   Genitourinary: Negative.  Negative for dysuria and hematuria.   Musculoskeletal: Negative.  Negative for arthralgias, gait problem and myalgias.   Skin: Positive for rash and wound.   Allergic/Immunologic: Negative.    Neurological: Negative.  Negative for dizziness, syncope, weakness, numbness and headaches.   Hematological: Negative.  Negative for adenopathy.   Psychiatric/Behavioral: Negative.  Negative for confusion and dysphoric mood.   All other systems reviewed and are negative.    Objective:     Vital Signs (Most Recent):  Temp: 98.9 °F (37.2 °C) (01/25/22 1530)  Pulse: 81 (01/25/22 1745)  Resp: (!) 33 (01/25/22 1745)  BP: 133/67 (01/25/22 1115)  SpO2: 99 % (01/25/22 1257) Vital Signs (24h Range):  Temp:  [98.4 °F (36.9 °C)-99.4 °F (37.4 °C)] 98.9 °F (37.2 °C)  Pulse:  [66-83] 81  Resp:  [14-43] 33  SpO2:  [92 %-99 %] 99 %  BP: (120-163)/(60-79) 133/67     Weight: 108.1 kg (238 lb 5.1 oz)  Body mass index is 33.24 kg/m².    Intake/Output Summary (Last 24 hours) at 1/25/2022 1817  Last data filed at 1/25/2022 1800  Gross per 24 hour   Intake 1063.11 ml   Output 1850 ml   Net -786.89 ml      Physical Exam  Constitutional:       General: He is not in acute distress.     Appearance: He is well-developed.  He is not diaphoretic.   HENT:      Head: Normocephalic and atraumatic.   Eyes:      Conjunctiva/sclera: Conjunctivae normal.      Pupils: Pupils are equal, round, and reactive to light.   Neck:      Thyroid: No thyromegaly.      Vascular: No JVD.   Cardiovascular:      Rate and Rhythm: Normal rate and regular rhythm.      Heart sounds: Normal heart sounds. No murmur heard.  No friction rub. No gallop.    Pulmonary:      Effort: Pulmonary effort is normal. No respiratory distress.      Breath sounds: Normal breath sounds. No wheezing or rales.   Abdominal:      General: Bowel sounds are normal. There is no distension.      Palpations: Abdomen is soft.      Tenderness: There is no abdominal tenderness. There is no guarding or rebound.   Musculoskeletal:         General: No tenderness. Normal range of motion.      Right lower leg: Edema present.   Lymphadenopathy:      Cervical: No cervical adenopathy.   Skin:     General: Skin is warm and dry.      Findings: Rash present. No erythema.      Comments: Right lower leg.  Circumferential erythema and marked swelling.  Wounds no longer weeping.  Tender to palpation.   Neurological:      Mental Status: He is alert and oriented to person, place, and time.      Cranial Nerves: No cranial nerve deficit.      Deep Tendon Reflexes: Reflexes are normal and symmetric. Reflexes normal.   Psychiatric:         Behavior: Behavior normal.         Thought Content: Thought content normal.         Judgment: Judgment normal.         Significant Labs: All pertinent labs within the past 24 hours have been reviewed.    Significant Imaging: I have reviewed all pertinent imaging results/findings within the past 24 hours.

## 2022-01-26 NOTE — PROGRESS NOTES
Therapy with Vancomycin complete and/or consult discontinued by provider.  Pharmacy will sign off, please re-consult as needed.    Darline Maxwell, KalinD

## 2022-01-26 NOTE — PLAN OF CARE
[facility] - Intensive Care (Hospital)  Initial Discharge Assessment       Primary Care Provider: Michael Fitzpatrick MD    Admission Diagnosis: Shortness of breath [R06.02]  Swelling [R60.9]  Cellulitis of right lower extremity [L03.115]  Chest pain [R07.9]  Acute renal failure superimposed on chronic kidney disease, unspecified CKD stage, unspecified acute renal failure type [N17.9, N18.9]    Admission Date: 1/23/2022  Expected Discharge Date: 1/26/2022    Discharge Barriers Identified: None    Payor: BLUE CROSS BLUE SHIELD / Plan: [facility] [place] [facility] / Product Type: Commercial /     Extended Emergency Contact Information  Primary Emergency Contact: Joycelyn Allen   [facility]  Home Phone: [phone]  Relation: Spouse    Discharge Plan A: Home Health ([facility])         [place], [place] - [address] [address]  [address] [address]  [facility] [address]  Phone: [phone] Fax: [phone]      Initial Assessment (most recent)     Adult Discharge Assessment - 01/24/22 1537        Discharge Assessment    Assessment Type Discharge Planning Assessment     Confirmed/corrected address, phone number and insurance Yes     Confirmed Demographics Correct on Facesheet     Source of Information patient     When was your last doctors appointment? 12/02/21     Communicated JUSTIN with patient/caregiver Date not available/Unable to determine     Reason For Admission Cellulitis of right lower extremity     Lives With other (see comments);spouse   sister in law    Facility Arrived From: home     Do you expect to return to your current living situation? Yes     Do you have help at home or someone to help you manage your care at home? Yes     Who are your caregiver(s) and their phone number(s)? Spouse, Joycelynriaz Allen     Prior to hospitilization cognitive status: Alert/Oriented     Current cognitive status: Alert/Oriented     Walking or Climbing Stairs Difficulty none     Dressing/Bathing Difficulty none     Home  Accessibility wheelchair accessible     Home Layout Able to live on 1st floor     Equipment Currently Used at Home none     Readmission within 30 days? No     Patient currently being followed by outpatient case management? No     Do you currently have service(s) that help you manage your care at home? No     Do you take prescription medications? Yes     Do you have prescription coverage? Yes     Coverage Medicare     Do you have any problems affording any of your prescribed medications? No     Is the patient taking medications as prescribed? yes     Who is going to help you get home at discharge? spouse     How do you get to doctors appointments? car, drives self     Are you on dialysis? No     Do you take coumadin? No     Discharge Plan A Home with family     DME Needed Upon Discharge  none     Discharge Plan discussed with: Patient     Discharge Barriers Identified None        Relationship/Environment    Name(s) of Who Lives With Patient Joycelyn Allen DC disposition: Home with home health   Transportation: Private vehicle   Home health orders sent to [facility] via [facility].      SUJIT Coelho

## 2022-01-26 NOTE — PROGRESS NOTES
Pharmacokinetic Assessment Follow Up: IV Vancomycin    Vancomycin serum concentration assessment(s):    The random level was drawn correctly and can be used to guide therapy at this time. The measurement is within the desired definitive target range of 10 to 15 mcg/mL.    Vancomycin Regimen Plan:    Will continue pulse dosing regimen for now while monitoring improving renal function with a one time dose of 1250 mg (12 mg/kg).    Re-dose when the random level is less than 15 mcg/mL, next level to be drawn at 1800 on 1/26    Drug levels (last 3 results):  Recent Labs   Lab Result Units 01/24/22  1322 01/25/22  0410 01/26/22  0335   Vancomycin, Random ug/mL 9.7 16.3 12.3       Pharmacy will continue to follow and monitor vancomycin.    Please contact pharmacy at extension [phone] for questions regarding this assessment.    Thank you for the consult,   Laura Walker       Patient brief summary:  Lemuel Allen is a 66 y.o. male initiated on antimicrobial therapy with IV Vancomycin for treatment of skin & soft tissue infection    The patient's current regimen is pulse dosing.    Drug Allergies:   Review of patient's allergies indicates:   Allergen Reactions    Betamethasone Swelling    Prednisone        Actual Body Weight:   108.1 kg    Renal Function:   Estimated Creatinine Clearance: 75.7 mL/min (based on SCr of 1.2 mg/dL).,     Dialysis Method (if applicable):  N/A    CBC (last 72 hours):  Recent Labs   Lab Result Units 01/23/22  0826 01/24/22  0411 01/25/22  0410 01/26/22  0335   WBC K/uL 8.70 8.74 9.29 8.52   Hemoglobin g/dL 10.2* 9.9* 10.1* 9.8*   Hematocrit % 31.5* 31.0* 31.1* 30.5*   Platelets K/uL 140* 148* 220 245   Gran % % 86.7* 80.8* 73.0  --    Lymph % % 6.0* 9.6* 19.0  --    Mono % % 3.8* 5.7 4.0  --    Eosinophil % % 1.8 1.6 1.0  --    Basophil % % 0.2 0.2 0.0  --    Differential Method  Automated Automated Manual  --        Metabolic Panel (last 72 hours):  Recent Labs   Lab Result Units 01/23/22  0826  01/23/22  1140 01/23/22  1417 01/24/22  0411 01/25/22  0410 01/26/22  0335   Sodium mmol/L 134*  --  137 135* 140  140 138   Sodium, Urine mmol/L  --  21  --   --   --   --    Potassium mmol/L 3.8  --  4.3 4.1 4.2  4.5 4.0   Chloride mmol/L 100  --  105 103 104  105 104   CO2 mmol/L 22*  --  20* 19* 25  24 25   Glucose mg/dL 108  --  104 95 101  102 106   Glucose, UA   --  Negative  --   --   --   --    BUN mg/dL 72*  --  64* 66* 46*  45* 29*   Creatinine mg/dL 3.4*  --  2.8* 2.4* 1.7*  1.7* 1.2   Albumin g/dL 2.5*  --   --  2.4* 2.5*  2.5* 2.4*   Total Bilirubin mg/dL 0.5  --   --  0.5 0.6 0.5   Alkaline Phosphatase U/L 220*  --   --  243* 227* 190*   AST U/L 47*  --   --  46* 43* 34   ALT U/L 33  --   --  34 35 32   Phosphorus mg/dL  --   --   --   --  2.2*  --        Vancomycin Administrations:  vancomycin given in the last 96 hours                     vancomycin 1.5 g in dextrose 5 % 250 mL IVPB (ready to mix) (mg) 1,500 mg New Bag 01/25/22 1053    vancomycin 1.5 g in dextrose 5 % 250 mL IVPB (ready to mix) (mg) 1,500 mg New Bag 01/24/22 1645    vancomycin 2 g in dextrose 5 % 500 mL IVPB (mg) 2,000 mg New Bag 01/23/22 1422                    Microbiologic Results:  Microbiology Results (last 7 days)       Procedure Component Value Units Date/Time    Blood culture #1 **CANNOT BE ORDERED STAT** [[id number]] Collected: 01/23/22 0836    Order Status: Completed Specimen: Blood from Peripheral, Forearm, Left Updated: 01/25/22 1612     Blood Culture, Routine No Growth to date      No Growth to date      No Growth to date    Blood culture #2 **CANNOT BE ORDERED STAT** [[id number]] Collected: 01/23/22 0836    Order Status: Completed Specimen: Blood from Peripheral, Antecubital, Left Updated: 01/25/22 1612     Blood Culture, Routine No Growth to date      No Growth to date      No Growth to date    Culture, Anaerobe [[id number]] Collected: 01/24/22 1540    Order Status: Sent Specimen: Wound from Leg, Right Updated:  01/25/22 0144    Aerobic culture [[id number]] Collected: 01/24/22 1540    Order Status: Sent Specimen: Wound from Leg, Right Updated: 01/25/22 0144

## 2022-01-27 LAB — BACTERIA SPEC AEROBE CULT: NORMAL

## 2022-01-27 NOTE — HOSPITAL COURSE
Admitted for evaluation and treatment of right lower extremity cellulitis. Started empiric treatment with vancomycin and cefepime. Right lower extremity was markedly erythematous with weeping edema. No. Once. Warm and tender. He also had an elevated BNP. Started careful diuresis. Patient also had an acute kidney injury possibly attributed to sepsis and side effect of Bactrim. He continually made interval improvement. Swelling of the leg improved but did not resolve. He remained fever free and WBC count was normal.CRP level was markedly elevated on admission and decreased at the time of discharge. Discharge plan to return home and complete a course of Augmentin and doxycycline. Follow-up outpatient with primary care.

## 2022-01-27 NOTE — DISCHARGE SUMMARY
[facility] ([facility])  Hospital Medicine  Discharge Summary      Patient Name: Lemuel Allen  MRN: [id number]  Patient Class: IP- Inpatient  Admission Date: 1/23/2022  Hospital Length of Stay: 3 days  Discharge Date and Time: 1/26/2022  4:35 PM  Attending Physician:  Stephen Ga MD  Discharging Provider: Stephen Ga MD  Primary Care Provider: Michael Fitzpatrick MD      HPI:   Lemuel Allen is a 65 yo male with past medical history of hypertension, hyperlipidemia and GERD who presented to [facility] ED for evaluation of worsening right lower extremity cellulitis. Patient reports 4 day history of erythema, edema and weeping to right lower extremity. He was evaluated in ED on 1/19/22 for similar complaint. Clindamycin and Bactrim initiated for management of cellulitis. Patient and spouse at bedside reports worsening erythema and edema with fever over the past 24 hours (now resolved) despite compliance with po antibiotics. Patient denies n/v/d and abdominal pain. Of note, he reports shortness of breath exacerbated by exertion and lying flat.  Pt denies known history of COPD and CHF.     ED evaluation: VSS on arrival. Labs remarkable for BUN/Cr 72/3.4 (baseline Cr 1.2-1.4), bnp 527, ferritin 815, , procal 1. CXR shows subtle ground-glass infiltrates in both lung fields which may represent COVID pneumonia. Rapid COVID 19 negative. Venous doppler negative for DVT. He received doxycycline and 2 liters IVF. Patient admitted to hospital medicine for further evaluation.          * No surgery found *      Hospital Course:   Admitted for evaluation and treatment of right lower extremity cellulitis. Started empiric treatment with vancomycin and cefepime. Right lower extremity was markedly erythematous with weeping edema. No. Once. Warm and tender. He also had an elevated BNP. Started careful diuresis. Patient also had an acute kidney injury possibly attributed to sepsis and side effect of Bactrim. He  continually made interval improvement. Swelling of the leg improved but did not resolve. He remained fever free and WBC count was normal.CRP level was markedly elevated on admission and decreased at the time of discharge. Discharge plan to return home and complete a course of Augmentin and doxycycline. Follow-up outpatient with primary care.       Goals of Care Treatment Preferences:  Code Status: Full Code      Consults:     No new Assessment & Plan notes have been filed under this hospital service since the last note was generated.  Service: Hospital Medicine    Final Active Diagnoses:    Diagnosis Date Noted POA    PRINCIPAL PROBLEM:  YULISA (acute kidney injury) [N17.9] 01/23/2022 Yes    Cellulitis of right lower extremity [L03.115] 01/23/2022 Yes    Shortness of breath [R06.02] 01/23/2022 Yes    Hypertension [I10] 01/23/2022 Yes    Hyperlipidemia [E78.5] 01/23/2022 Yes      Problems Resolved During this Admission:       Discharged Condition: good    Disposition: Home-Health Care [facility]    Follow Up:   Follow-up Information     Michael Fitzpatrick MD In 1 week.    Specialty: Family Medicine  Why: Hospital follow up right lower leg cellulitis/dermatitis.  Contact information:  [address] [id number] [phone]             [facility] [employer].    Specialties: Home Health Services, Home Therapy Services, Home Living Aide Services  Contact information:  [address] [facility] [address]  [facility] [address] [phone]                     Patient Instructions:      Diet Cardiac     Activity as tolerated       Significant Diagnostic Studies: Labs: All labs within the past 24 hours have been reviewed    Pending Diagnostic Studies:     None         Medications:  Reconciled Home Medications:      Medication List      START taking these medications    amoxicillin-clavulanate 875-125mg 875-125 mg per tablet  Commonly known as: AUGMENTIN  Take 1 tablet by mouth every 12 (twelve)  hours. for 7 days     doxycycline 100 MG tablet  Commonly known as: VIBRA-TABS  Take 1 tablet (100 mg total) by mouth every 12 (twelve) hours. for 7 days        CONTINUE taking these medications    amLODIPine 5 MG tablet  Commonly known as: NORVASC  Take 5 mg by mouth once daily.     aspirin 81 MG EC tablet  Commonly known as: ECOTRIN  Take 81 mg by mouth once daily.     carvediloL 25 MG tablet  Commonly known as: COREG  Take 25 mg by mouth 2 (two) times daily.     cholecalciferol (vitamin D3) 1,250 mcg (50,000 unit) capsule  Take 1 capsule by mouth twice a week.     fenofibrate 160 MG Tab  Take 160 mg by mouth once daily.     hydroCHLOROthiazide 25 MG tablet  Commonly known as: HYDRODIURIL  Take 25 mg by mouth once daily.     losartan 100 MG tablet  Commonly known as: COZAAR  Take 100 mg by mouth once daily.     omeprazole 40 MG capsule  Commonly known as: PRILOSEC  Take 40 mg by mouth once daily.     potassium chloride 10 MEQ Cpsr  Commonly known as: MICRO-K  Take 10 mEq by mouth once daily.     rosuvastatin 10 MG tablet  Commonly known as: CRESTOR  Take 10 mg by mouth nightly.        STOP taking these medications    clindamycin 150 MG capsule  Commonly known as: CLEOCIN     meloxicam 15 MG tablet  Commonly known as: MOBIC     sulfamethoxazole-trimethoprim 800-160mg 800-160 mg Tab  Commonly known as: BACTRIM DS            Indwelling Lines/Drains at time of discharge:   Lines/Drains/Airways     None                 Time spent on the discharge of patient: 30 minutes      Stephen Ga MD  Department of Hospital Medicine  [facility] - Intensive [facility] ([facility])

## 2022-01-28 ENCOUNTER — PATIENT OUTREACH (OUTPATIENT)
Dept: ADMINISTRATIVE | Facility: CLINIC | Age: 67
End: 2022-01-28
Payer: MEDICARE

## 2022-01-28 LAB
BACTERIA BLD CULT: NORMAL
BACTERIA BLD CULT: NORMAL

## 2022-01-28 NOTE — PROGRESS NOTES
C3 nurse spoke with Lemuel Allen for a TCC post hospital discharge follow up call. The patient has a scheduled HOSFU appointment with Dr. Fitzpatrick on 1/31/2022 @ 9:30 am.

## 2022-01-28 NOTE — PATIENT INSTRUCTIONS
Patient Education       Acute Kidney Injury Discharge Instructions   About this topic   The kidneys are bean-shaped organs in the middle of your back, just above your waist. They filter your blood to get rid of waste products and extra fluid from your body. The waste is turned into urine.     Acute kidney injury is a sudden injury or illness that causes problems with your kidneys. They shut down and stop filtering the blood. You may also hear it called acute kidney failure. Many things can cause this to happen like:  · Low blood flow to the kidneys. This may happen because of low blood pressure or heart failure or other causes.  · Damage to the kidneys from infections, drugs, or injuries  · The path for urine to leave the body is blocked. Kidney stones, cancer, and prostate problems are some conditions that may cause a block.  · Pregnancy or certain diseases may cause damage to the kidneys.  The doctor may use many tests to find out the cause of the kidney problems. Care is based on the cause and how much kidney damage there is. Most often with treatment, the kidneys will start to work on their own again. This may take weeks or months. Treatment may include drugs, dialysis, and diet changes.  What care is needed at home?   · Ask your doctor what you need to do when you go home. Make sure you ask questions if you do not understand what the doctor says. This way you will know what you need to do.  · Make sure to take all of the drugs ordered by your doctor.  · Keep your legs at or above the level of your heart when in bed. This may help with your body's blood flow and reduce swelling in your feet.  What follow-up care is needed?   Your doctor may ask you to make visits to the office to check on your progress. Be sure to keep these visits.  What drugs may be needed?   The doctor may order drugs to:  · Prevent or fight an infection  · Remove body fluids  · Control potassium levels in your blood  · Give you more calcium  in your blood  Will physical activity be limited?   Talk to your doctor about the right amount of activity for you. Your doctor may want you to do more or less activity. If surgery was done, avoid strenuous activities and lifting heavy objects.  What changes to diet are needed?   You may have to limit how much you drink. You may also need to limit some kinds of foods to lessen the work of your healing kidneys. Your doctor may have you meet with a dietitian to help you plan your meals.     What problems could happen?   · Long-term kidney failure. This is also called chronic kidney failure or chronic renal failure.  · Heart problems  · High blood pressure  · Anemia (low blood counts)  · Muscle weakness  · Fluid buildup  · Bleeding  · Death  When do I need to call the doctor?   · Signs of infection. These include a fever of 100.4°F (38°C) or higher, chills, pain with passing urine.  · Swelling of your ankles, feet, or face  · No urine for more than 6 hours  · Sudden chest pain or breathing problems  · Very bad belly pain, upset stomach, or throwing up  · Very bad weakness of the legs  · You are not feeling better in 2 to 3 days or you are feeling worse  Helpful tips   If you have a family member or relative with long-term kidney disease, talk to your doctor about how often you should have your kidneys checked.  Teach Back: Helping You Understand   The Teach Back Method helps you understand the information we are giving you. After you talk with the staff, tell them in your own words what you learned. This helps to make sure the staff has described each thing clearly. It also helps to explain things that may have been confusing. Before going home, make sure you can do these:  · I can tell you about my condition.  · I can tell you what changes I need to make with my diet, drugs, or activities.  · I can tell you what I will do if I have a fever, swelling, no urine, chest pain, or trouble breathing.  Where can I learn more?    [employer]  http://www.kidney.org/atoz/pdf/choosing_treat.pdf   Last Reviewed Date   2020-02-13  Consumer Information Use and Disclaimer   This information is not specific medical advice and does not replace information you receive from your health care provider. This is only a brief summary of general information. It does NOT include all information about conditions, illnesses, injuries, tests, procedures, treatments, therapies, discharge instructions or life-style choices that may apply to you. You must talk with your health care provider for complete information about your health and treatment options. This information should not be used to decide whether or not to accept your health care providers advice, instructions or recommendations. Only your health care provider has the knowledge and training to provide advice that is right for you.  Copyright   Copyright © 2021 [employer] and its affiliates and/or licensors. All rights reserved.    [employer]issy teaching reviewed with Lemuel Allen . He verbalized understanding.    Education was provided based on the patient's discharge diagnosis using the attached [employer] patient education as a reference.

## 2022-01-31 LAB — BACTERIA SPEC ANAEROBE CULT: ABNORMAL

## 2022-02-04 ENCOUNTER — EXTERNAL HOME HEALTH (OUTPATIENT)
Dept: HOME HEALTH SERVICES | Facility: HOSPITAL | Age: 67
End: 2022-02-04
Payer: MEDICARE

## 2022-02-23 ENCOUNTER — HOSPITAL ENCOUNTER (EMERGENCY)
Facility: HOSPITAL | Age: 67
Discharge: HOME | End: 2022-02-23
Attending: EMERGENCY MEDICINE
Payer: MEDICARE

## 2022-02-23 VITALS
SYSTOLIC BLOOD PRESSURE: 158 MMHG | DIASTOLIC BLOOD PRESSURE: 78 MMHG | HEIGHT: 71 IN | OXYGEN SATURATION: 97 % | HEART RATE: 78 BPM | BODY MASS INDEX: 30.8 KG/M2 | RESPIRATION RATE: 18 BRPM | WEIGHT: 220 LBS | TEMPERATURE: 98 F

## 2022-02-23 DIAGNOSIS — L03.115 CELLULITIS OF RIGHT LOWER EXTREMITY: Primary | ICD-10-CM

## 2022-02-23 LAB
ALBUMIN SERPL BCP-MCNC: 4.1 G/DL (ref 3.5–5.2)
ALP SERPL-CCNC: 69 U/L (ref 55–135)
ALT SERPL W/O P-5'-P-CCNC: 16 U/L (ref 10–44)
ANION GAP SERPL CALC-SCNC: 11 MMOL/L (ref 8–16)
AST SERPL-CCNC: 23 U/L (ref 10–40)
BASOPHILS # BLD AUTO: 0.02 K/UL (ref 0–0.2)
BASOPHILS NFR BLD: 0.3 % (ref 0–1.9)
BILIRUB SERPL-MCNC: 0.3 MG/DL (ref 0.1–1)
BNP SERPL-MCNC: 93 PG/ML (ref 0–99)
BUN SERPL-MCNC: 20 MG/DL (ref 8–23)
CALCIUM SERPL-MCNC: 9.4 MG/DL (ref 8.7–10.5)
CHLORIDE SERPL-SCNC: 104 MMOL/L (ref 95–110)
CO2 SERPL-SCNC: 23 MMOL/L (ref 23–29)
CREAT SERPL-MCNC: 1.5 MG/DL (ref 0.5–1.4)
CRP SERPL-MCNC: 1.6 MG/L (ref 0–8.2)
DIFFERENTIAL METHOD: ABNORMAL
EOSINOPHIL # BLD AUTO: 0.1 K/UL (ref 0–0.5)
EOSINOPHIL NFR BLD: 1.2 % (ref 0–8)
ERYTHROCYTE [DISTWIDTH] IN BLOOD BY AUTOMATED COUNT: 13.7 % (ref 11.5–14.5)
ERYTHROCYTE [SEDIMENTATION RATE] IN BLOOD BY WESTERGREN METHOD: 53 MM/HR (ref 0–10)
EST. GFR  (AFRICAN AMERICAN): 55 ML/MIN/1.73 M^2
EST. GFR  (NON AFRICAN AMERICAN): 48 ML/MIN/1.73 M^2
GLUCOSE SERPL-MCNC: 101 MG/DL (ref 70–110)
HCT VFR BLD AUTO: 34.7 % (ref 40–54)
HGB BLD-MCNC: 11.3 G/DL (ref 14–18)
IMM GRANULOCYTES # BLD AUTO: 0.02 K/UL (ref 0–0.04)
IMM GRANULOCYTES NFR BLD AUTO: 0.3 % (ref 0–0.5)
LYMPHOCYTES # BLD AUTO: 1.6 K/UL (ref 1–4.8)
LYMPHOCYTES NFR BLD: 24.5 % (ref 18–48)
MCH RBC QN AUTO: 28.8 PG (ref 27–31)
MCHC RBC AUTO-ENTMCNC: 32.6 G/DL (ref 32–36)
MCV RBC AUTO: 89 FL (ref 82–98)
MONOCYTES # BLD AUTO: 0.6 K/UL (ref 0.3–1)
MONOCYTES NFR BLD: 8.7 % (ref 4–15)
NEUTROPHILS # BLD AUTO: 4.2 K/UL (ref 1.8–7.7)
NEUTROPHILS NFR BLD: 65 % (ref 38–73)
NRBC BLD-RTO: 0 /100 WBC
PLATELET # BLD AUTO: 217 K/UL (ref 150–450)
PMV BLD AUTO: 8.9 FL (ref 9.2–12.9)
POTASSIUM SERPL-SCNC: 3.9 MMOL/L (ref 3.5–5.1)
PROT SERPL-MCNC: 8.3 G/DL (ref 6–8.4)
RBC # BLD AUTO: 3.92 M/UL (ref 4.6–6.2)
SODIUM SERPL-SCNC: 138 MMOL/L (ref 136–145)
WBC # BLD AUTO: 6.53 K/UL (ref 3.9–12.7)

## 2022-02-23 PROCEDURE — 86140 C-REACTIVE PROTEIN: CPT | Performed by: EMERGENCY MEDICINE

## 2022-02-23 PROCEDURE — 80053 COMPREHEN METABOLIC PANEL: CPT | Performed by: EMERGENCY MEDICINE

## 2022-02-23 PROCEDURE — 99284 EMERGENCY DEPT VISIT MOD MDM: CPT | Mod: 25

## 2022-02-23 PROCEDURE — 85651 RBC SED RATE NONAUTOMATED: CPT | Performed by: EMERGENCY MEDICINE

## 2022-02-23 PROCEDURE — 83880 ASSAY OF NATRIURETIC PEPTIDE: CPT | Performed by: EMERGENCY MEDICINE

## 2022-02-23 PROCEDURE — 85025 COMPLETE CBC W/AUTO DIFF WBC: CPT | Performed by: EMERGENCY MEDICINE

## 2022-02-23 PROCEDURE — 36000 PLACE NEEDLE IN VEIN: CPT

## 2022-02-23 RX ORDER — DOXYCYCLINE 100 MG/1
100 CAPSULE ORAL 2 TIMES DAILY
Qty: 14 CAPSULE | Refills: 0 | Status: SHIPPED | OUTPATIENT
Start: 2022-02-23 | End: 2022-03-02

## 2022-02-23 RX ORDER — AMOXICILLIN AND CLAVULANATE POTASSIUM 875; 125 MG/1; MG/1
1 TABLET, FILM COATED ORAL 2 TIMES DAILY
Qty: 14 TABLET | Refills: 0 | Status: SHIPPED | OUTPATIENT
Start: 2022-02-23 | End: 2022-03-09 | Stop reason: DRUGHIGH

## 2022-02-23 NOTE — ED PROVIDER NOTES
"SCRIBE #1 NOTE: I, Maira Marco, am scribing for, and in the presence of, Esau Wong MD. I have scribed the entire note.      History      Chief Complaint   Patient presents with    Cellulitis     Pt states, "I think I have cellulitis on my right leg."       Review of patient's allergies indicates:   Allergen Reactions    Bactrim [sulfamethoxazole-trimethoprim] Swelling    Betamethasone Swelling    Prednisone         HPI   HPI    2/23/2022, 4:15 PM   History obtained from the patient      History of Present Illness: Lemuel Allen is a 66 y.o. male patient with a PMHx of acid reflux, HLD, and HTN who presents to the Emergency Department for a wound to his R ankle which onset gradually. The pt started to experience sxs of RLE cellulitis in November of 2021 and was treated with augmentin, then he returned to the ER on 1/19/2022 for RLE cellulitis and was treated with bactrim and clindamycin. Then the pt was seen in the ER on 1/23/2022 after he was prescribed bactrim because he was SOB, and he reports that was admitted to the hospital for an YULISA. On 1/26/2022, the pt was discharged from the hospital, and was prescribed Augmentin and doxycycline.   The pt has finished his abx course, and reports that his sxs have improved, but he recently picked off a scab from a wound on his R ankle and now it is draining. Symptoms are constant and moderate in severity. No mitigating or exacerbating factors reported. Associated sxs include draining to R ankle wound, R ankle erythema and R knee erythema. Patient denies any fever, chills, CP, SOB, weakness, dizziness, and all other sxs at this time. The pt denies being on abx at this time. No further complaints or concerns at this time.         Arrival mode: Personal vehicle     PCP: Michael Fitzpatrick MD       Past Medical History:  Past Medical History:   Diagnosis Date    Acid reflux     Hyperlipidemia     Hypertension        Past Surgical History:  Past Surgical History: "   Procedure Laterality Date    BACK SURGERY      CHOLECYSTECTOMY      JOINT REPLACEMENT      TOTAL KNEE ARTHROPLASTY           Family History:  Family History   Problem Relation Age of Onset    Hypertension Mother     Hypertension Father     Hyperlipidemia Father        Social History:  Social History     Tobacco Use    Smoking status: Never Smoker    Smokeless tobacco: Never Used   Substance and Sexual Activity    Alcohol use: Not Currently    Drug use: Never    Sexual activity: Not Currently       ROS   Review of Systems   Constitutional: Negative for chills and fever.   HENT: Negative for sore throat.    Respiratory: Negative for shortness of breath.    Cardiovascular: Negative for chest pain.   Gastrointestinal: Negative for nausea.   Genitourinary: Negative for dysuria.   Musculoskeletal: Negative for back pain.   Skin: Positive for color change (R ankle erythema and R knee erythema) and wound (R ankle). Negative for rash.        (+) draining to R ankle wound   Neurological: Negative for dizziness and weakness.   Hematological: Does not bruise/bleed easily.   All other systems reviewed and are negative.    Physical Exam      Initial Vitals [02/23/22 1414]   BP Pulse Resp Temp SpO2   (!) 183/94 80 20 98.1 °F (36.7 °C) 96 %      MAP       --          Physical Exam  Nursing Notes and Vital Signs Reviewed.  Constitutional: Patient is in no acute distress. Well-developed and well-nourished.  Head: Atraumatic. Normocephalic.  Eyes: PERRL. EOM intact. Conjunctivae are not pale. No scleral icterus.  ENT: Mucous membranes are moist. Oropharynx is clear and symmetric.    Neck: Supple. Full ROM. No lymphadenopathy.  Cardiovascular: Regular rate. Regular rhythm. No murmurs, rubs, or gallops. Distal pulses are 2+ and symmetric.  Pulmonary/Chest: No respiratory distress. Clear to auscultation bilaterally. No wheezing or rales.  Abdominal: Soft and non-distended.  There is no tenderness.  No rebound, guarding, or  "rigidity.  Musculoskeletal: Moves all extremities. No obvious deformities. No edema.  Skin: Warm and dry. Calor to the R knee. R ankle and R knee erythema. See pictures below.  Neurological:  Alert, awake, and appropriate.  Normal speech.  No acute focal neurological deficits are appreciated.  Psychiatric: Normal affect. Good eye contact. Appropriate in content.            ED Course    Procedures  ED Vital Signs:  Vitals:    02/23/22 1414 02/23/22 1554 02/23/22 1730   BP: (!) 183/94  (!) 158/78   Pulse: 80 75 78   Resp: 20  18   Temp: 98.1 °F (36.7 °C)  98.3 °F (36.8 °C)   TempSrc: Oral  Oral   SpO2: 96%  97%   Weight: 99.8 kg (220 lb 0.3 oz)     Height: 5' 11" (1.803 m)         Abnormal Lab Results:  Labs Reviewed   CBC W/ AUTO DIFFERENTIAL - Abnormal; Notable for the following components:       Result Value    RBC 3.92 (*)     Hemoglobin 11.3 (*)     Hematocrit 34.7 (*)     MPV 8.9 (*)     All other components within normal limits   COMPREHENSIVE METABOLIC PANEL - Abnormal; Notable for the following components:    Creatinine 1.5 (*)     eGFR if  55 (*)     eGFR if non  48 (*)     All other components within normal limits   SEDIMENTATION RATE - Abnormal; Notable for the following components:    Sed Rate 53 (*)     All other components within normal limits   B-TYPE NATRIURETIC PEPTIDE   C-REACTIVE PROTEIN        All Lab Results:  Results for orders placed or performed during the hospital encounter of 02/23/22   CBC auto differential   Result Value Ref Range    WBC 6.53 3.90 - 12.70 K/uL    RBC 3.92 (L) 4.60 - 6.20 M/uL    Hemoglobin 11.3 (L) 14.0 - 18.0 g/dL    Hematocrit 34.7 (L) 40.0 - 54.0 %    MCV 89 82 - 98 fL    MCH 28.8 27.0 - 31.0 pg    MCHC 32.6 32.0 - 36.0 g/dL    RDW 13.7 11.5 - 14.5 %    Platelets 217 150 - 450 K/uL    MPV 8.9 (L) 9.2 - 12.9 fL    Immature Granulocytes 0.3 0.0 - 0.5 %    Gran # (ANC) 4.2 1.8 - 7.7 K/uL    Immature Grans (Abs) 0.02 0.00 - 0.04 K/uL    Lymph " # 1.6 1.0 - 4.8 K/uL    Mono # 0.6 0.3 - 1.0 K/uL    Eos # 0.1 0.0 - 0.5 K/uL    Baso # 0.02 0.00 - 0.20 K/uL    nRBC 0 0 /100 WBC    Gran % 65.0 38.0 - 73.0 %    Lymph % 24.5 18.0 - 48.0 %    Mono % 8.7 4.0 - 15.0 %    Eosinophil % 1.2 0.0 - 8.0 %    Basophil % 0.3 0.0 - 1.9 %    Differential Method Automated    Comprehensive metabolic panel   Result Value Ref Range    Sodium 138 136 - 145 mmol/L    Potassium 3.9 3.5 - 5.1 mmol/L    Chloride 104 95 - 110 mmol/L    CO2 23 23 - 29 mmol/L    Glucose 101 70 - 110 mg/dL    BUN 20 8 - 23 mg/dL    Creatinine 1.5 (H) 0.5 - 1.4 mg/dL    Calcium 9.4 8.7 - 10.5 mg/dL    Total Protein 8.3 6.0 - 8.4 g/dL    Albumin 4.1 3.5 - 5.2 g/dL    Total Bilirubin 0.3 0.1 - 1.0 mg/dL    Alkaline Phosphatase 69 55 - 135 U/L    AST 23 10 - 40 U/L    ALT 16 10 - 44 U/L    Anion Gap 11 8 - 16 mmol/L    eGFR if African American 55 (A) >60 mL/min/1.73 m^2    eGFR if non African American 48 (A) >60 mL/min/1.73 m^2   B-Type natriuretic peptide (BNP)   Result Value Ref Range    BNP 93 0 - 99 pg/mL   Sedimentation rate   Result Value Ref Range    Sed Rate 53 (H) 0 - 10 mm/Hr   C-reactive protein   Result Value Ref Range    CRP 1.6 0.0 - 8.2 mg/L         Imaging Results:  Imaging Results          X-Ray Chest PA And Lateral (Final result)  Result time 02/23/22 15:42:19    Final result by Ap Cope MD (02/23/22 15:42:19)                 Impression:      1.  Negative for acute process involving the chest.  Improved lung volumes on today's study.    2.  Stable findings as noted above.      Electronically signed by: Ap Cope MD  Date:    02/23/2022  Time:    15:42             Narrative:    EXAMINATION:  XR CHEST PA AND LATERAL    CLINICAL HISTORY:  Chest Pain;    COMPARISON:  Studies dating back to April 13, 2011    FINDINGS:  The lungs are clear. The cardiac silhouette size is normal. The trachea is midline and the mediastinal width is normal. Negative for focal infiltrate, effusion or  pneumothorax. Pulmonary vasculature is normal. Negative for osseous abnormalities. Stable tortuosity of the aorta, degenerative changes of the spine and both shoulder girdles with convex-right curvature of the thoracic spine and cardiophrenic fat pads.                                        The Emergency Provider reviewed the vital signs and test results, which are outlined above.    ED Discussion   5:14 PM: Reassessed pt at this time. The pt will be given a prescription for antibiotics for 1 week, and has been advised to f/u with his PCP. Discussed with pt all pertinent ED information and results. Discussed pt dx and plan of tx. Gave pt all f/u and return to the ED instructions. All questions and concerns were addressed at this time. Pt expresses understanding of information and instructions, and is comfortable with plan to discharge. Pt is stable for discharge.    I discussed with patient and/or family/caretaker that evaluation in the ED does not suggest any emergent or life threatening medical conditions requiring immediate intervention beyond what was provided in the ED, and I believe patient is safe for discharge.  Regardless, an unremarkable evaluation in the ED does not preclude the development or presence of a serious of life threatening condition. As such, patient was instructed to return immediately for any worsening or change in current symptoms.         ED Medication(s):  Medications - No data to display     Follow-up Information     Michael Fitzpatrick MD. Schedule an appointment as soon as possible for a visit in 2 days.    Specialty: Family Medicine  Contact information:  [address] [address]  [phone]                         Discharge Medication List as of 2/23/2022  5:01 PM      START taking these medications    Details   amoxicillin-clavulanate 875-125mg (AUGMENTIN) 875-125 mg per tablet Take 1 tablet by mouth 2 (two) times daily., Starting Wed 2/23/2022, Normal       doxycycline (VIBRAMYCIN) 100 MG Cap Take 1 capsule (100 mg total) by mouth 2 (two) times daily. for 7 days, Starting Wed 2/23/2022, Until Wed 3/2/2022, Normal              Medication List      START taking these medications    amoxicillin-clavulanate 875-125mg 875-125 mg per tablet  Commonly known as: AUGMENTIN  Take 1 tablet by mouth 2 (two) times daily.     doxycycline 100 MG Cap  Commonly known as: VIBRAMYCIN  Take 1 capsule (100 mg total) by mouth 2 (two) times daily. for 7 days        ASK your doctor about these medications    amLODIPine 5 MG tablet  Commonly known as: NORVASC     aspirin 81 MG EC tablet  Commonly known as: ECOTRIN     carvediloL 25 MG tablet  Commonly known as: COREG     cholecalciferol (vitamin D3) 1,250 mcg (50,000 unit) capsule     fenofibrate 160 MG Tab     hydroCHLOROthiazide 25 MG tablet  Commonly known as: HYDRODIURIL     losartan 100 MG tablet  Commonly known as: COZAAR     omeprazole 40 MG capsule  Commonly known as: PRILOSEC     potassium chloride 10 MEQ Cpsr  Commonly known as: MICRO-K     rosuvastatin 10 MG tablet  Commonly known as: CRESTOR           Where to Get Your Medications      These medications were sent to [facility] [address] [facility]  [address] [address] [address]    Phone: [phone]   · amoxicillin-clavulanate 875-125mg 875-125 mg per tablet  · doxycycline 100 MG Cap           Medical Decision Making    Medical Decision Making:   Clinical Tests:   Lab Tests: Ordered and Reviewed  Radiological Study: Ordered and Reviewed           Scribe Attestation:   Scribe #1: I performed the above scribed service and the documentation accurately describes the services I performed. I attest to the accuracy of the note.    Attending:   Physician Attestation Statement for Scribe #1: I, Esau Wong MD, personally performed the services described in this documentation, as scribed by Maira Lara, in my presence, and it is both accurate and complete.           Clinical Impression       ICD-10-CM ICD-9-CM   1. Cellulitis of right lower extremity  L03.115 682.6       Disposition:   Disposition: Discharged  Condition: Stable         Esau Wong MD  02/24/22 0045

## 2022-02-23 NOTE — FIRST PROVIDER EVALUATION
"Medical screening exam completed.  I have conducted a focused provider triage encounter, findings are as follows:    Brief history of present illness:  Reports recently admitted for cellulitis of rle. Taken antibiotics as prescribed. Cellulitis has continued     Vitals:    02/23/22 1414   BP: (!) 183/94   BP Location: Right arm   Patient Position: Sitting   Pulse: 80   Resp: 20   Temp: 98.1 °F (36.7 °C)   TempSrc: Oral   SpO2: 96%   Weight: 99.8 kg (220 lb 0.3 oz)   Height: 5' 11" (1.803 m)       Preliminary workup initiated; this workup will be continued and followed by the physician or advanced practice provider that is assigned to the patient when roomed.  "

## 2022-03-02 ENCOUNTER — NURSE TRIAGE (OUTPATIENT)
Dept: ADMINISTRATIVE | Facility: CLINIC | Age: 67
End: 2022-03-02
Payer: MEDICARE

## 2022-03-04 ENCOUNTER — OFFICE VISIT (OUTPATIENT)
Dept: INTERNAL MEDICINE | Facility: CLINIC | Age: 67
End: 2022-03-04
Payer: MEDICARE

## 2022-03-04 VITALS
SYSTOLIC BLOOD PRESSURE: 138 MMHG | OXYGEN SATURATION: 98 % | WEIGHT: 218.5 LBS | TEMPERATURE: 98 F | HEART RATE: 81 BPM | DIASTOLIC BLOOD PRESSURE: 80 MMHG | BODY MASS INDEX: 30.59 KG/M2 | HEIGHT: 71 IN

## 2022-03-04 DIAGNOSIS — L03.115 CELLULITIS OF RIGHT LOWER EXTREMITY: Primary | ICD-10-CM

## 2022-03-04 DIAGNOSIS — I10 PRIMARY HYPERTENSION: ICD-10-CM

## 2022-03-04 PROCEDURE — 99204 PR OFFICE/OUTPT VISIT, NEW, LEVL IV, 45-59 MIN: ICD-10-PCS | Mod: S$PBB,,,

## 2022-03-04 PROCEDURE — 99999 PR PBB SHADOW E&M-EST. PATIENT-LVL V: CPT | Mod: PBBFAC,,,

## 2022-03-04 PROCEDURE — 99999 PR PBB SHADOW E&M-EST. PATIENT-LVL V: ICD-10-PCS | Mod: PBBFAC,,,

## 2022-03-04 PROCEDURE — 99204 OFFICE O/P NEW MOD 45 MIN: CPT | Mod: S$PBB,,,

## 2022-03-04 PROCEDURE — 99215 OFFICE O/P EST HI 40 MIN: CPT | Mod: PBBFAC

## 2022-03-04 NOTE — PROGRESS NOTES
Lemuel Allen  03/04/2022  [id number]    Michael Fitzpatrick MD  Patient Care Team:  Michael Fitzpatrick MD as PCP - General (Family Medicine)  Has the patient seen any provider outside of the [facility] network since the last visit? (yes). If yes, HIPPA forms completed and records requested.        Visit Type:an urgent visit for an existing problem     Chief Complaint:  Chief Complaint   Patient presents with    Leg Injury     Celluilitis in r leg, has had 2 rounds of antibiotics       History of Present Illness:    66 year old male comes in with cellulitis to right lowr leg  He went to the ER on 1/19  He was diagnosed with cellulitis to RLE and started on antibiotics   He went to the ER for RLE pain  Blood cultures were negative and he was started on Bactrim and clinda      He went back to the hospital on 1/23 for worsening cellulitis  He was admitted at that time with RLE cellulitis and YULISA  He was D/C on 1/26 on Augmentin and Doxy  He states that the Bactrim was messing with his kidneys    He did a F/U visit on 2/17 for cellulitis and he was ordered to go to the ER    He went to the ER on 2/23  Was sent home on Augmentin and Doxy   He finished taking the antibiotics on Wednesday    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  He is a cook at a   He is always on his feet           History:  Past Medical History:   Diagnosis Date    Acid reflux     Hyperlipidemia     Hypertension      Past Surgical History:   Procedure Laterality Date    BACK SURGERY      CHOLECYSTECTOMY      JOINT REPLACEMENT      TOTAL KNEE ARTHROPLASTY       Family History   Problem Relation Age of Onset    Hypertension Mother     Hypertension Father     Hyperlipidemia Father      Social History     Socioeconomic History    Marital status:    Tobacco Use    Smoking status: Never Smoker    Smokeless tobacco: Never Used   Substance and Sexual Activity    Alcohol use: Not Currently    Drug use: Never     Sexual activity: Not Currently     Patient Active Problem List   Diagnosis    YULISA (acute kidney injury)    Cellulitis of right lower extremity    Shortness of breath    Hypertension    Hyperlipidemia     Review of patient's allergies indicates:   Allergen Reactions    Bactrim [sulfamethoxazole-trimethoprim] Swelling    Betamethasone Swelling    Prednisone        The following were reviewed at this visit: active problem list, medication list, allergies, family history, social history, and health maintenance.    Medications:  Current Outpatient Medications on File Prior to Visit   Medication Sig Dispense Refill    amLODIPine (NORVASC) 5 MG tablet Take 5 mg by mouth once daily.      amoxicillin-clavulanate 875-125mg (AUGMENTIN) 875-125 mg per tablet Take 1 tablet by mouth 2 (two) times daily. 14 tablet 0    aspirin (ECOTRIN) 81 MG EC tablet Take 81 mg by mouth once daily.      carvediloL (COREG) 25 MG tablet Take 25 mg by mouth 2 (two) times daily.      cholecalciferol, vitamin D3, 1,250 mcg (50,000 unit) capsule Take 1 capsule by mouth twice a week.      fenofibrate 160 MG Tab Take 160 mg by mouth once daily.      hydroCHLOROthiazide (HYDRODIURIL) 25 MG tablet Take 25 mg by mouth once daily.      losartan (COZAAR) 100 MG tablet Take 100 mg by mouth once daily.      omeprazole (PRILOSEC) 40 MG capsule Take 40 mg by mouth once daily.      potassium chloride (MICRO-K) 10 MEQ CpSR Take 10 mEq by mouth once daily.      rosuvastatin (CRESTOR) 10 MG tablet Take 10 mg by mouth nightly.       No current facility-administered medications on file prior to visit.       Medications have been reviewed and reconciled with patient at this visit.  Barriers to medications reviewed with patient.    Adverse reactions to current medications reviewed with patient..    Over the counter medications reviewed and reconciled with patient.    Exam:  Wt Readings from Last 3 Encounters:   02/23/22 99.8 kg (220 lb 0.3 oz)    01/26/22 108.7 kg (239 lb 10.2 oz)   01/19/22 105.3 kg (232 lb 4.1 oz)     Temp Readings from Last 3 Encounters:   02/23/22 98.3 °F (36.8 °C) (Oral)   01/26/22 98.4 °F (36.9 °C)   01/19/22 100 °F (37.8 °C) (Oral)     BP Readings from Last 3 Encounters:   02/23/22 (!) 158/78   01/26/22 (!) 142/77   01/19/22 (!) 160/68     Pulse Readings from Last 3 Encounters:   02/23/22 78   01/26/22 75   01/19/22 90     There is no height or weight on file to calculate BMI.      Review of Systems   Constitutional: Negative.  Negative for chills and fever.   HENT: Negative.  Negative for congestion, sinus pain and sore throat.    Eyes: Negative for blurred vision and double vision.   Respiratory: Negative for cough, sputum production, shortness of breath and wheezing.    Cardiovascular: Negative for chest pain, palpitations and leg swelling.   Gastrointestinal: Negative for abdominal pain, constipation, diarrhea, heartburn, nausea and vomiting.   Genitourinary: Negative.    Musculoskeletal: Negative.    Skin: Positive for rash.   Neurological: Negative.    Endo/Heme/Allergies: Negative.  Negative for polydipsia. Does not bruise/bleed easily.   Psychiatric/Behavioral: Negative for depression and substance abuse.     Physical Exam  Vitals and nursing note reviewed.   Constitutional:       General: He is not in acute distress.     Appearance: He is well-developed. He is not diaphoretic.   HENT:      Head: Normocephalic and atraumatic.      Right Ear: External ear normal.      Left Ear: External ear normal.      Nose: Nose normal.   Eyes:      General:         Right eye: No discharge.         Left eye: No discharge.      Conjunctiva/sclera: Conjunctivae normal.      Pupils: Pupils are equal, round, and reactive to light.   Neck:      Thyroid: No thyromegaly.   Cardiovascular:      Rate and Rhythm: Normal rate and regular rhythm.      Pulses: Normal pulses.      Heart sounds: Normal heart sounds. No murmur heard.  Pulmonary:       Effort: Pulmonary effort is normal. No respiratory distress.      Breath sounds: Normal breath sounds. No wheezing.   Abdominal:      General: Bowel sounds are normal.   Musculoskeletal:         General: Normal range of motion.      Cervical back: Normal range of motion and neck supple.   Lymphadenopathy:      Cervical: No cervical adenopathy.   Skin:     Capillary Refill: Capillary refill takes less than 2 seconds.      Findings: Erythema present.   Neurological:      Mental Status: He is alert and oriented to person, place, and time.      Cranial Nerves: No cranial nerve deficit.   Psychiatric:         Behavior: Behavior normal.         Thought Content: Thought content normal.         Judgment: Judgment normal.           Laboratory Reviewed ({Yes)  Lab Results   Component Value Date    WBC 6.53 02/23/2022    HGB 11.3 (L) 02/23/2022    HCT 34.7 (L) 02/23/2022     02/23/2022    ALT 16 02/23/2022    AST 23 02/23/2022     02/23/2022    K 3.9 02/23/2022     02/23/2022    CREATININE 1.5 (H) 02/23/2022    BUN 20 02/23/2022    CO2 23 02/23/2022    INR 1.1 05/26/2011       Lemuel was seen today for leg injury.    Diagnoses and all orders for this visit:    Cellulitis of right lower extremity    Primary hypertension   AT goal during visit     Leg has improved significantly since last week  He denies fever, body aches.chills. chest pain  Discussed if cellulitis returns, may need to be seen by ID  Patient was in agreement with POC       Care Plan/Goals: Reviewed    Goals    None         Follow up: No follow-ups on file.    After visit summary was printed and given to patient upon discharge today.  Patient goals and care plan are included in After Visit Summary.

## 2022-03-09 ENCOUNTER — TELEPHONE (OUTPATIENT)
Dept: INTERNAL MEDICINE | Facility: CLINIC | Age: 67
End: 2022-03-09
Payer: MEDICARE

## 2022-03-09 DIAGNOSIS — L03.115 CELLULITIS OF RIGHT LOWER EXTREMITY: Primary | ICD-10-CM

## 2022-03-09 RX ORDER — DOXYCYCLINE 100 MG/1
100 CAPSULE ORAL EVERY 12 HOURS
Qty: 20 CAPSULE | Refills: 0 | Status: SHIPPED | OUTPATIENT
Start: 2022-03-09

## 2022-03-09 RX ORDER — AMOXICILLIN AND CLAVULANATE POTASSIUM 875; 125 MG/1; MG/1
1 TABLET, FILM COATED ORAL EVERY 12 HOURS
Qty: 20 TABLET | Refills: 0 | Status: SHIPPED | OUTPATIENT
Start: 2022-03-09

## 2022-03-09 NOTE — TELEPHONE ENCOUNTER
----- Message from Marlena Ricketts sent at 3/9/2022  8:28 AM CST -----  Contact: Lemuel  Patient would like a call back at [phone] in regard to his leg being red again. He would like to know what to do.    Thanks

## 2022-03-09 NOTE — TELEPHONE ENCOUNTER
----- Message from Amber Shah sent at 3/9/2022  9:59 AM CST -----  Regarding: Missed call  Contact: Patient  .Type:  Patient Returning Call    Who Called: Patient  Who Left Message for Patient: Myriam  Does the patient know what this is regarding?: Missed call  Would the patient rather a call back or a response via [facility]? Call  Best Call Back Number: .[phone]    Additional Information:

## 2022-03-09 NOTE — TELEPHONE ENCOUNTER
Called patient no answer left message for patient to call back. Also advised him that I would send a message to ID department to possibly be seen soon.

## 2022-03-09 NOTE — TELEPHONE ENCOUNTER
Spoke with patient, he states that the redness has moved up to his knee around to the back of the knee.     I advised him that I would send a message to ID to get him a sooner appointment, that their department would be calling him. Patient agreed but he is asking if something (abx) could be called in for the meantime until he gets the appointment. Please advise.

## 2022-03-21 ENCOUNTER — OFFICE VISIT (OUTPATIENT)
Dept: INFECTIOUS DISEASES | Facility: CLINIC | Age: 67
End: 2022-03-21
Payer: MEDICARE

## 2022-03-21 VITALS
SYSTOLIC BLOOD PRESSURE: 185 MMHG | DIASTOLIC BLOOD PRESSURE: 85 MMHG | HEART RATE: 66 BPM | WEIGHT: 218.25 LBS | BODY MASS INDEX: 30.44 KG/M2

## 2022-03-21 DIAGNOSIS — L03.115 CELLULITIS OF RIGHT LOWER EXTREMITY: ICD-10-CM

## 2022-03-21 DIAGNOSIS — I10 PRIMARY HYPERTENSION: ICD-10-CM

## 2022-03-21 DIAGNOSIS — E78.5 HYPERLIPIDEMIA, UNSPECIFIED HYPERLIPIDEMIA TYPE: ICD-10-CM

## 2022-03-21 PROCEDURE — 99204 PR OFFICE/OUTPT VISIT, NEW, LEVL IV, 45-59 MIN: ICD-10-PCS | Mod: S$PBB,,, | Performed by: INTERNAL MEDICINE

## 2022-03-21 PROCEDURE — 99213 OFFICE O/P EST LOW 20 MIN: CPT | Mod: PBBFAC | Performed by: INTERNAL MEDICINE

## 2022-03-21 PROCEDURE — 99999 PR PBB SHADOW E&M-EST. PATIENT-LVL III: CPT | Mod: PBBFAC,,, | Performed by: INTERNAL MEDICINE

## 2022-03-21 PROCEDURE — 99999 PR PBB SHADOW E&M-EST. PATIENT-LVL III: ICD-10-PCS | Mod: PBBFAC,,, | Performed by: INTERNAL MEDICINE

## 2022-03-21 PROCEDURE — 99204 OFFICE O/P NEW MOD 45 MIN: CPT | Mod: S$PBB,,, | Performed by: INTERNAL MEDICINE

## 2022-03-21 NOTE — ASSESSMENT & PLAN NOTE
Treated . No new indication for more antibiotics-  He was on Doxycycline and Augmentin   He was told to inform us if he develops any worsening erythema  .

## 2022-03-21 NOTE — PROGRESS NOTES
Subjective:       Patient ID: Lemuel Allen is a 67 y.o. male.    Chief Complaint: Follow up cellulitis    HPI      66 y.o. male with PMH as listed below who presented with recurrent cellulitis.  Initial episode was 11/2021 and second episode was  01/2022 .He developed the lesions over the right lower extremity .Previous regime was Bactrim-and he developed swelling and YULISA with. He was discharged on Augmentin./doxycycline. He was seen in the Ed -02/23/22 and was given Augmentin/dpxycycline for a week   He comes today for follow up . He denies any new symptoms.        Past Medical History:   Diagnosis Date    Acid reflux     Hyperlipidemia     Hypertension      Past Surgical History:   Procedure Laterality Date    BACK SURGERY      CHOLECYSTECTOMY      JOINT REPLACEMENT      TOTAL KNEE ARTHROPLASTY       Family History   Problem Relation Age of Onset    Hypertension Mother     Hypertension Father     Hyperlipidemia Father      Social History     Socioeconomic History    Marital status:    Tobacco Use    Smoking status: Never Smoker    Smokeless tobacco: Never Used   Substance and Sexual Activity    Alcohol use: Not Currently    Drug use: Never    Sexual activity: Not Currently     Review of Systems   Constitutional: Negative for activity change, appetite change, chills, diaphoresis and fatigue.   HENT: Negative for congestion, dental problem, ear discharge, ear pain and facial swelling.    Eyes: Negative for pain, discharge and itching.   Respiratory: Negative for apnea, cough, chest tightness and shortness of breath.    Cardiovascular: Negative for chest pain and leg swelling.   Gastrointestinal: Negative for abdominal distention and abdominal pain.   Endocrine: Negative for cold intolerance, heat intolerance and polydipsia.   Genitourinary: Negative for difficulty urinating, dysuria and enuresis.   Musculoskeletal: Negative for arthralgias and back pain.   Skin: Negative for color change and  pallor.   Allergic/Immunologic: Negative for environmental allergies and food allergies.   Neurological: Negative for dizziness, facial asymmetry, light-headedness and headaches.   Hematological: Negative for adenopathy. Does not bruise/bleed easily.   Psychiatric/Behavioral: Negative for agitation and behavioral problems.       Objective:      Physical Exam  Vitals and nursing note reviewed.   Constitutional:       Appearance: He is well-developed.   HENT:      Head: Normocephalic and atraumatic.      Nose: Nose normal.   Eyes:      Comments: PERRL   Cardiovascular:      Rate and Rhythm: Normal rate and regular rhythm.      Heart sounds: Normal heart sounds.   Pulmonary:      Effort: Pulmonary effort is normal. No respiratory distress.      Breath sounds: Normal breath sounds. No wheezing or rales.   Abdominal:      Tenderness: There is no abdominal tenderness.   Musculoskeletal:         General: Normal range of motion.      Cervical back: Normal range of motion and neck supple.   Skin:     General: Skin is dry.      Comments: See pics   Neurological:      Mental Status: He is alert and oriented to person, place, and time.                  Assessment:         1. Cellulitis of right lower extremity  Ambulatory referral/consult to Infectious Disease   2. Primary hypertension     3. Hyperlipidemia, unspecified hyperlipidemia type         Plan:       Problem List Items Addressed This Visit     Cellulitis of right lower extremity     Treated . No new indication for more antibiotics-  He was on Doxycycline and Augmentin   He was told to inform us if he develops any worsening erythema  .  Pics at this time was compared to 2/23 Ed visit -it looks much better            Hypertension     On Norvasc -follow PCP           Hyperlipidemia     On fenofibrate

## 2022-04-21 ENCOUNTER — HOSPITAL ENCOUNTER (EMERGENCY)
Facility: HOSPITAL | Age: 67
Discharge: HOME | End: 2022-04-21
Attending: EMERGENCY MEDICINE
Payer: MEDICARE

## 2022-04-21 VITALS
HEART RATE: 69 BPM | HEIGHT: 71 IN | SYSTOLIC BLOOD PRESSURE: 136 MMHG | OXYGEN SATURATION: 96 % | RESPIRATION RATE: 19 BRPM | TEMPERATURE: 98 F | BODY MASS INDEX: 31.08 KG/M2 | WEIGHT: 222 LBS | DIASTOLIC BLOOD PRESSURE: 80 MMHG

## 2022-04-21 DIAGNOSIS — M79.89 RIGHT LEG SWELLING: Primary | ICD-10-CM

## 2022-04-21 DIAGNOSIS — I10 CHRONIC HYPERTENSION: ICD-10-CM

## 2022-04-21 DIAGNOSIS — I87.2 VENOUS STASIS DERMATITIS OF RIGHT LOWER EXTREMITY: ICD-10-CM

## 2022-04-21 PROCEDURE — 99284 EMERGENCY DEPT VISIT MOD MDM: CPT | Mod: 25

## 2022-04-21 NOTE — ED PROVIDER NOTES
SCRIBE #1 NOTE: I, Jennifer Finley, am scribing for, and in the presence of, Kylie Ordoñez MD. I have scribed the entire note.       History     Chief Complaint   Patient presents with    Leg Swelling     Pt presents for leg swelling and pain that started approx 2 days ago pt has a hx of celluitis was admitted in January for same issue.      Review of patient's allergies indicates:   Allergen Reactions    Bactrim [sulfamethoxazole-trimethoprim] Swelling    Betamethasone Swelling    Neuromuscular blockers, steroidal     Prednisone          History of Present Illness     HPI    4/21/2022, 11:34 AM  History obtained from the patient      History of Present Illness: Lemuel Allen is a 67 y.o. male patient with a PMHx of HTN, acid reflux, hyperlipidemia, cellulitis, who presents to the Emergency Department for evaluation of R leg swelling which onset gradually 2 days PTA. Symptoms are constant and moderate in severity. No mitigating or exacerbating factors reported. No associated sxs reported. Patient denies any fever, SOB, CP, N/V/D, and all other sxs at this time. Pt is taking Amlodipine and Losartan. No further complaints or concerns at this time.       Arrival mode: Personal vehicle    PCP: Michael Fitzpatrick MD        Past Medical History:  Past Medical History:   Diagnosis Date    Acid reflux     Hyperlipidemia     Hypertension        Past Surgical History:  Past Surgical History:   Procedure Laterality Date    BACK SURGERY      CHOLECYSTECTOMY      JOINT REPLACEMENT      TOTAL KNEE ARTHROPLASTY           Family History:  Family History   Problem Relation Age of Onset    Hypertension Mother     Hypertension Father     Hyperlipidemia Father        Social History:  Social History     Tobacco Use    Smoking status: Never Smoker    Smokeless tobacco: Never Used   Substance and Sexual Activity    Alcohol use: Not Currently    Drug use: Never    Sexual activity: Not Currently        Review of Systems      Review of Systems   Constitutional: Negative for fever.   HENT: Negative for sore throat.    Respiratory: Negative for shortness of breath.    Cardiovascular: Negative for chest pain.   Gastrointestinal: Negative for diarrhea, nausea and vomiting.   Genitourinary: Negative for dysuria.   Musculoskeletal: Negative for back pain.        (+) R leg swelling   Skin: Negative for rash.   Neurological: Negative for weakness.   Hematological: Does not bruise/bleed easily.   All other systems reviewed and are negative.       Physical Exam     Initial Vitals [04/21/22 1103]   BP Pulse Resp Temp SpO2   (!) 149/75 72 16 98.3 °F (36.8 °C) 95 %      MAP       --          Physical Exam  Nursing Notes and Vital Signs Reviewed.  Constitutional: Patient is in no acute distress. Well-developed and well-nourished.  Head: Atraumatic. Normocephalic.  Eyes: PERRL. EOM intact. Conjunctivae are not pale. No scleral icterus.  ENT: Mucous membranes are moist. Oropharynx is clear and symmetric.    Neck: Supple. Full ROM. No lymphadenopathy.  Cardiovascular: Regular rate. Regular rhythm. No murmurs, rubs, or gallops. Distal pulses are 2+ and symmetric. Neurovascularly intact.  Pulmonary/Chest: No respiratory distress. Clear to auscultation bilaterally. No wheezing or rales.  Abdominal: Soft and non-distended.  There is no tenderness.  No rebound, guarding, or rigidity. Good bowel sounds.  Genitourinary: No CVA tenderness  Musculoskeletal: Moves all extremities. No obvious deformities. No calf tenderness. Pt has what appears to be chronic venous stasis dermatitis changes. No obvious erythema or warmth, no signs of cellulitis. Pt had some mild dependent edema. Good pulses in LE.  Skin: Warm and dry.  Neurological:  Alert, awake, and appropriate.  Normal speech.  No acute focal neurological deficits are appreciated.  Psychiatric: Normal affect. Good eye contact. Appropriate in content.     ED Course   Procedures  ED Vital Signs:  Vitals:     "04/21/22 1102 04/21/22 1103 04/21/22 1222   BP:  (!) 149/75 136/80   Pulse:  72 69   Resp:  16 19   Temp:  98.3 °F (36.8 °C) 98 °F (36.7 °C)   TempSrc:  Oral Oral   SpO2:  95% 96%   Weight: 100.7 kg (222 lb 0.1 oz)     Height: 5' 11" (1.803 m)         Abnormal Lab Results:  Labs Reviewed - No data to display     All Lab Results:      Imaging Results:  Imaging Results          US Lower Extremity Veins Right (Final result)  Result time 04/21/22 12:05:34    Final result by Hansel Sepulveda MD (04/21/22 12:05:34)                 Impression:      No evidence of deep venous thrombosis in the right lower extremity.      Electronically signed by: Hansel Sepulveda  Date:    04/21/2022  Time:    12:05             Narrative:    EXAMINATION:  US LOWER EXTREMITY VEINS RIGHT    CLINICAL HISTORY:  Other specified soft tissue disorders    TECHNIQUE:  Duplex and color flow Doppler evaluation and graded compression of the right lower extremity veins was performed.    COMPARISON:  None    FINDINGS:  Right thigh veins: The common femoral, femoral, popliteal, upper greater saphenous, and deep femoral veins are patent and free of thrombus. The veins are normally compressible and have normal phasic flow and augmentation response.    Right calf veins: The visualized calf veins are patent.    Contralateral CFV: The contralateral (left) common femoral vein is patent and free of thrombus.    Miscellaneous: None                                          The Emergency Provider reviewed the vital signs and test results, which are outlined above.     ED Discussion       12:10 PM: Reassessed pt at this time. Discussed with pt all pertinent ED information and results. Discussed pt dx and plan of tx. Gave pt all f/u and return to the ED instructions. All questions and concerns were addressed at this time. Pt expresses understanding of information and instructions, and is comfortable with plan to discharge. Pt is stable for discharge.    I discussed with " patient and/or family/caretaker that evaluation in the ED does not suggest any emergent or life threatening medical conditions requiring immediate intervention beyond what was provided in the ED, and I believe patient is safe for discharge.  Regardless, an unremarkable evaluation in the ED does not preclude the development or presence of a serious of life threatening condition. As such, patient was instructed to return immediately for any worsening or change in current symptoms.       Medical Decision Making:   Clinical Tests:   Radiological Study: Ordered and Reviewed           ED Medication(s):  Medications - No data to display    Discharge Medication List as of 4/21/2022 12:10 PM           Follow-up Information     Michael Fitzpatrick MD. Schedule an appointment as soon as possible for a visit in 2 days.    Specialty: Family Medicine  Why: Return to the Emergency Room, If symptoms worsen  Contact information:  [address] [address]  [phone]                             Scribe Attestation:   Scribe #1: I performed the above scribed service and the documentation accurately describes the services I performed. I attest to the accuracy of the note.     Attending:   Physician Attestation Statement for Scribe #1: I, Kylie Ordoñez MD, personally performed the services described in this documentation, as scribed by Jennifer Finley, in my presence, and it is both accurate and complete.           Clinical Impression       ICD-10-CM ICD-9-CM   1. Right leg swelling  M79.89 729.81   2. Venous stasis dermatitis of right lower extremity  I87.2 454.1   3. Chronic hypertension  I10 401.9       Disposition:   Disposition: Discharged  Condition: Stable       Kylie Ordoñez MD  04/21/22 1530

## 2022-05-09 ENCOUNTER — HOSPITAL ENCOUNTER (EMERGENCY)
Facility: HOSPITAL | Age: 67
Discharge: HOME | End: 2022-05-09
Attending: EMERGENCY MEDICINE
Payer: MEDICARE

## 2022-05-09 VITALS
SYSTOLIC BLOOD PRESSURE: 151 MMHG | RESPIRATION RATE: 18 BRPM | WEIGHT: 221.56 LBS | DIASTOLIC BLOOD PRESSURE: 83 MMHG | TEMPERATURE: 98 F | OXYGEN SATURATION: 97 % | HEART RATE: 60 BPM | BODY MASS INDEX: 30.9 KG/M2

## 2022-05-09 DIAGNOSIS — R07.9 CHEST PAIN, UNSPECIFIED TYPE: Primary | ICD-10-CM

## 2022-05-09 DIAGNOSIS — R07.9 CHEST PAIN: ICD-10-CM

## 2022-05-09 LAB
ALBUMIN SERPL BCP-MCNC: 4.2 G/DL (ref 3.5–5.2)
ALP SERPL-CCNC: 51 U/L (ref 55–135)
ALT SERPL W/O P-5'-P-CCNC: 23 U/L (ref 10–44)
ANION GAP SERPL CALC-SCNC: 10 MMOL/L (ref 8–16)
AST SERPL-CCNC: 23 U/L (ref 10–40)
BASOPHILS # BLD AUTO: 0.04 K/UL (ref 0–0.2)
BASOPHILS NFR BLD: 0.5 % (ref 0–1.9)
BILIRUB SERPL-MCNC: 0.3 MG/DL (ref 0.1–1)
BUN SERPL-MCNC: 26 MG/DL (ref 8–23)
CALCIUM SERPL-MCNC: 9.7 MG/DL (ref 8.7–10.5)
CHLORIDE SERPL-SCNC: 106 MMOL/L (ref 95–110)
CK SERPL-CCNC: 100 U/L (ref 20–200)
CO2 SERPL-SCNC: 25 MMOL/L (ref 23–29)
CREAT SERPL-MCNC: 1.1 MG/DL (ref 0.5–1.4)
DIFFERENTIAL METHOD: ABNORMAL
EOSINOPHIL # BLD AUTO: 0.1 K/UL (ref 0–0.5)
EOSINOPHIL NFR BLD: 1.8 % (ref 0–8)
ERYTHROCYTE [DISTWIDTH] IN BLOOD BY AUTOMATED COUNT: 13.8 % (ref 11.5–14.5)
EST. GFR  (AFRICAN AMERICAN): >60 ML/MIN/1.73 M^2
EST. GFR  (NON AFRICAN AMERICAN): >60 ML/MIN/1.73 M^2
GLUCOSE SERPL-MCNC: 92 MG/DL (ref 70–110)
HCT VFR BLD AUTO: 39.1 % (ref 40–54)
HGB BLD-MCNC: 12.8 G/DL (ref 14–18)
IMM GRANULOCYTES # BLD AUTO: 0.02 K/UL (ref 0–0.04)
IMM GRANULOCYTES NFR BLD AUTO: 0.3 % (ref 0–0.5)
LYMPHOCYTES # BLD AUTO: 1.4 K/UL (ref 1–4.8)
LYMPHOCYTES NFR BLD: 18.5 % (ref 18–48)
MCH RBC QN AUTO: 27.9 PG (ref 27–31)
MCHC RBC AUTO-ENTMCNC: 32.7 G/DL (ref 32–36)
MCV RBC AUTO: 85 FL (ref 82–98)
MONOCYTES # BLD AUTO: 0.6 K/UL (ref 0.3–1)
MONOCYTES NFR BLD: 8.2 % (ref 4–15)
NEUTROPHILS # BLD AUTO: 5.1 K/UL (ref 1.8–7.7)
NEUTROPHILS NFR BLD: 70.7 % (ref 38–73)
NRBC BLD-RTO: 0 /100 WBC
PLATELET # BLD AUTO: 178 K/UL (ref 150–450)
PMV BLD AUTO: 9.7 FL (ref 9.2–12.9)
POTASSIUM SERPL-SCNC: 3.9 MMOL/L (ref 3.5–5.1)
PROT SERPL-MCNC: 7.8 G/DL (ref 6–8.4)
RBC # BLD AUTO: 4.59 M/UL (ref 4.6–6.2)
SODIUM SERPL-SCNC: 141 MMOL/L (ref 136–145)
TROPONIN I SERPL DL<=0.01 NG/ML-MCNC: 0.01 NG/ML (ref 0–0.03)
TROPONIN I SERPL DL<=0.01 NG/ML-MCNC: 0.01 NG/ML (ref 0–0.03)
WBC # BLD AUTO: 7.28 K/UL (ref 3.9–12.7)

## 2022-05-09 PROCEDURE — 82550 ASSAY OF CK (CPK): CPT | Performed by: NURSE PRACTITIONER

## 2022-05-09 PROCEDURE — 85025 COMPLETE CBC W/AUTO DIFF WBC: CPT | Performed by: NURSE PRACTITIONER

## 2022-05-09 PROCEDURE — 84484 ASSAY OF TROPONIN QUANT: CPT | Mod: 91 | Performed by: EMERGENCY MEDICINE

## 2022-05-09 PROCEDURE — 99285 EMERGENCY DEPT VISIT HI MDM: CPT | Mod: 25

## 2022-05-09 PROCEDURE — 80053 COMPREHEN METABOLIC PANEL: CPT | Performed by: NURSE PRACTITIONER

## 2022-05-09 PROCEDURE — 25000003 PHARM REV CODE 250: Performed by: NURSE PRACTITIONER

## 2022-05-09 PROCEDURE — 36000 PLACE NEEDLE IN VEIN: CPT

## 2022-05-09 PROCEDURE — 84484 ASSAY OF TROPONIN QUANT: CPT | Performed by: NURSE PRACTITIONER

## 2022-05-09 RX ORDER — ASPIRIN 325 MG
TABLET ORAL
Status: DISCONTINUED
Start: 2022-05-09 | End: 2022-05-09 | Stop reason: HOSPADM

## 2022-05-09 RX ORDER — ASPIRIN 325 MG
325 TABLET ORAL
Status: COMPLETED | OUTPATIENT
Start: 2022-05-09 | End: 2022-05-09

## 2022-05-09 RX ADMIN — ASPIRIN 325 MG ORAL TABLET 325 MG: 325 PILL ORAL at 05:05

## 2022-05-09 NOTE — FIRST PROVIDER EVALUATION
Medical screening exam completed.  I have conducted a focused provider triage encounter, findings are as follows:    67-year-old male with complaint of left-sided chest pain since 11:00 a.m. this morning.  Also reports left hand numbness.    PE: 5/5 X 4, no chest wall tenderness, BBSCTA

## 2022-05-10 NOTE — DISCHARGE INSTRUCTIONS
Your workup today is benign.  Do recommend you see your cardiologist tomorrow for re-evaluation.  Return as needed for any worsening symptoms, problems, questions or concerns.

## 2022-05-10 NOTE — ED PROVIDER NOTES
SCRIBE #1 NOTE: I, Misty Ovalles, am scribing for, and in the presence of, Rafiq Phan Jr., MD. I have scribed the entire note.      History      Chief Complaint   Patient presents with    Chest Pain     Pt has chest pain with left hand tingling. Onset wast today at 1030. Hx HTN, hyperlipidemia, and acid reflux.       Review of patient's allergies indicates:   Allergen Reactions    Bactrim [sulfamethoxazole-trimethoprim] Swelling    Betamethasone Swelling    Neuromuscular blockers, steroidal     Prednisone         HPI   HPI    5/9/2022, 8:11 PM   History obtained from the patient      History of Present Illness: Lemuel Allen is a 67 y.o. male patient with PMHx of HTN and HLD who presents to the Emergency Department for CP which onset at 10:30 AM today. Pt states pain is on the left side of the chest and lasted 30 minutes. Symptoms are constant and moderate in severity. No mitigating or exacerbating factors reported. Associated sxs include LUE numbness. Patient denies any fever, chills, cough, congestion,, SOB, N/V/D, leg swelling, syncope, weakness, dizziness, and all other sxs at this time. Pt has been seen by cardiologist due to worsening HTN. Pt states having a recent stress test and EKG that was benign. No further complaints or concerns at this time.        Arrival mode: Personal vehicle     PCP: Екатерина Thorpe NP       Past Medical History:  Past Medical History:   Diagnosis Date    Acid reflux     Hyperlipidemia     Hypertension        Past Surgical History:  Past Surgical History:   Procedure Laterality Date    BACK SURGERY      CHOLECYSTECTOMY      JOINT REPLACEMENT      TOTAL KNEE ARTHROPLASTY           Family History:  Family History   Problem Relation Age of Onset    Hypertension Mother     Hypertension Father     Hyperlipidemia Father        Social History:  Social History     Tobacco Use    Smoking status: Never Smoker    Smokeless tobacco: Never Used   Substance and Sexual  Activity    Alcohol use: Not Currently    Drug use: Never    Sexual activity: Not Currently       ROS   Review of Systems   Constitutional: Negative for chills, diaphoresis and fever.   HENT: Negative for congestion and sore throat.    Respiratory: Negative for cough and shortness of breath.    Cardiovascular: Positive for chest pain. Negative for leg swelling.   Gastrointestinal: Negative for abdominal pain, diarrhea, nausea and vomiting.   Genitourinary: Negative for dysuria.   Musculoskeletal: Negative for back pain.   Skin: Negative for rash.   Neurological: Positive for numbness (LUE). Negative for dizziness, syncope, weakness and headaches.   Hematological: Does not bruise/bleed easily.   All other systems reviewed and are negative.      Physical Exam      Initial Vitals   BP Pulse Resp Temp SpO2   05/09/22 1420 05/09/22 1420 05/09/22 1420 05/09/22 1420 05/09/22 1743   (!) 186/89 65 16 98.6 °F (37 °C) 97 %      MAP       --                 Physical Exam  Nursing Notes and Vital Signs Reviewed.  Constitutional: Patient is in no acute distress. Well-developed and well-nourished.  Head: Atraumatic. Normocephalic.  Eyes: PERRL. EOM intact. Conjunctivae are not pale. No scleral icterus.  ENT: Mucous membranes are moist. Oropharynx is clear and symmetric.    Neck: Supple. Full ROM. No lymphadenopathy.  Cardiovascular: Regular rate. Regular rhythm. No murmurs, rubs, or gallops. Distal pulses are 2+ and symmetric.  Pulmonary/Chest: No respiratory distress. Clear to auscultation bilaterally. No wheezing or rales.  Abdominal: Soft and non-distended.  There is no tenderness.  No rebound, guarding, or rigidity. Good bowel sounds.  Genitourinary: No CVA tenderness  Musculoskeletal: Moves all extremities. No obvious deformities. No edema. No calf tenderness.  Skin: Warm and dry.  Neurological:  Alert, awake, and appropriate.  Normal speech.  No acute focal neurological deficits are appreciated.  Psychiatric: Normal  affect. Good eye contact. Appropriate in content.    ED Course    Procedures  ED Vital Signs:  Vitals:    05/09/22 1420 05/09/22 1743 05/09/22 1909 05/09/22 2015   BP: (!) 186/89 (!) 175/94 (!) 176/93 (!) 151/83   Pulse: 65 62 71 60   Resp: 16 17 18 18   Temp: 98.6 °F (37 °C)  98.3 °F (36.8 °C)    TempSrc: Oral  Oral    SpO2:  97% 96% 97%   Weight: 100.5 kg (221 lb 9 oz)          Abnormal Lab Results:  Labs Reviewed   CBC W/ AUTO DIFFERENTIAL - Abnormal; Notable for the following components:       Result Value    RBC 4.59 (*)     Hemoglobin 12.8 (*)     Hematocrit 39.1 (*)     All other components within normal limits   COMPREHENSIVE METABOLIC PANEL - Abnormal; Notable for the following components:    BUN 26 (*)     Alkaline Phosphatase 51 (*)     All other components within normal limits   CK   TROPONIN I   TROPONIN I        All Lab Results:  Results for orders placed or performed during the hospital encounter of 05/09/22   CBC auto differential   Result Value Ref Range    WBC 7.28 3.90 - 12.70 K/uL    RBC 4.59 (L) 4.60 - 6.20 M/uL    Hemoglobin 12.8 (L) 14.0 - 18.0 g/dL    Hematocrit 39.1 (L) 40.0 - 54.0 %    MCV 85 82 - 98 fL    MCH 27.9 27.0 - 31.0 pg    MCHC 32.7 32.0 - 36.0 g/dL    RDW 13.8 11.5 - 14.5 %    Platelets 178 150 - 450 K/uL    MPV 9.7 9.2 - 12.9 fL    Immature Granulocytes 0.3 0.0 - 0.5 %    Gran # (ANC) 5.1 1.8 - 7.7 K/uL    Immature Grans (Abs) 0.02 0.00 - 0.04 K/uL    Lymph # 1.4 1.0 - 4.8 K/uL    Mono # 0.6 0.3 - 1.0 K/uL    Eos # 0.1 0.0 - 0.5 K/uL    Baso # 0.04 0.00 - 0.20 K/uL    nRBC 0 0 /100 WBC    Gran % 70.7 38.0 - 73.0 %    Lymph % 18.5 18.0 - 48.0 %    Mono % 8.2 4.0 - 15.0 %    Eosinophil % 1.8 0.0 - 8.0 %    Basophil % 0.5 0.0 - 1.9 %    Differential Method Automated    Comprehensive metabolic panel   Result Value Ref Range    Sodium 141 136 - 145 mmol/L    Potassium 3.9 3.5 - 5.1 mmol/L    Chloride 106 95 - 110 mmol/L    CO2 25 23 - 29 mmol/L    Glucose 92 70 - 110 mg/dL    BUN 26  (H) 8 - 23 mg/dL    Creatinine 1.1 0.5 - 1.4 mg/dL    Calcium 9.7 8.7 - 10.5 mg/dL    Total Protein 7.8 6.0 - 8.4 g/dL    Albumin 4.2 3.5 - 5.2 g/dL    Total Bilirubin 0.3 0.1 - 1.0 mg/dL    Alkaline Phosphatase 51 (L) 55 - 135 U/L    AST 23 10 - 40 U/L    ALT 23 10 - 44 U/L    Anion Gap 10 8 - 16 mmol/L    eGFR if African American >60 >60 mL/min/1.73 m^2    eGFR if non African American >60 >60 mL/min/1.73 m^2   CPK   Result Value Ref Range     20 - 200 U/L   Troponin I   Result Value Ref Range    Troponin I 0.008 0.000 - 0.026 ng/mL   Troponin I   Result Value Ref Range    Troponin I 0.011 0.000 - 0.026 ng/mL         Imaging Results:  Imaging Results          X-Ray Chest 1 View (Final result)  Result time 05/09/22 14:45:58    Final result by JORJE Lujan Sr., MD (05/09/22 14:45:58)                 Impression:      Normal study.      Electronically signed by: Juno Lujan MD  Date:    05/09/2022  Time:    14:45             Narrative:    EXAMINATION:  XR CHEST 1 VIEW    CLINICAL HISTORY:  Chest pain, unspecified    COMPARISON:  02/23/2022    FINDINGS:  The size of the heart is normal. The lungs are clear. There is no pneumothorax.  The costophrenic angles are sharp.                                      The EKG was ordered, reviewed, and independently interpreted by the ED provider.  Interpretation time: 18:30  Rate: 78 BPM  Rhythm: normal sinus rhythm  Interpretation: Inverted T waves. No STEMI.      The Emergency Provider reviewed the vital signs and test results, which are outlined above.    ED Discussion     9:25 PM: Reassessed pt at this time. Discussed with pt all pertinent ED information and results. Discussed pt dx and plan of tx. Gave pt all f/u and return to the ED instructions. All questions and concerns were addressed at this time. Pt expresses understanding of information and instructions, and is comfortable with plan to discharge. Pt is stable for discharge.    I discussed with patient  and/or family/caretaker that evaluation in the ED does not suggest any emergent or life threatening medical conditions requiring immediate intervention beyond what was provided in the ED, and I believe patient is safe for discharge.  Regardless, an unremarkable evaluation in the ED does not preclude the development or presence of a serious of life threatening condition. As such, patient was instructed to return immediately for any worsening or change in current symptoms.    9:36 PM  The patient is stable and nontoxic.  Patient has no prior cardiac history though has seen a cardiologist in the past.  He has had some left-sided chest pain lasting for brief period of time into the left shoulder.  There is no nausea vomiting diaphoresis associated with this.  This is not exertionally has no exertional symptoms.  Denies any calf pain or swelling.  His EKG is unchanged from previous and his cardiac enzymes are negative x2.  I discussed with the patient at length his diagnosis and plan of care.  He is to follow up with cardiologist tomorrow for re-evaluation return if his symptoms return or worsen in any way.  Patient verbalized understanding and agreement with all instructions and is safe for discharge he has remained pain-free since around 10:00 a.m. today.       ED Medication(s):  Medications   aspirin tablet 325 mg (325 mg Oral Given 5/9/22 1740)     Discharge Medication List as of 5/9/2022  9:22 PM          Follow-up Information     Екатерина Thorpe NP.    Specialty: Family Medicine  Contact information:  [address]  [address]  [facility] [address]  [phone]             Jesse Perez MD.    Specialties: Interventional Cardiology, Cardiology, Cardiovascular Disease  Contact information:  [address]  [address]  [facility] [address] [phone]                           Medical Decision Making              Scribe Attestation:   Scribe #1: I performed the above scribed service  and the documentation accurately describes the services I performed. I attest to the accuracy of the note.    Attending:   Physician Attestation Statement for Scribe #1: I, Rafiq Phan Jr., MD, personally performed the services described in this documentation, as scribed by Misty Ovalles, in my presence, and it is both accurate and complete.          Clinical Impression       ICD-10-CM ICD-9-CM   1. Chest pain, unspecified type  R07.9 786.50   2. Chest pain  R07.9 786.50               Rafiq Phan Jr., MD  05/09/22 2138

## 2022-11-30 ENCOUNTER — TELEPHONE (OUTPATIENT)
Dept: PREADMISSION TESTING | Facility: HOSPITAL | Age: 67
End: 2022-11-30
Payer: MEDICARE

## 2022-11-30 DIAGNOSIS — Z12.11 COLON CANCER SCREENING: Primary | ICD-10-CM

## 2022-11-30 NOTE — TELEPHONE ENCOUNTER
Referral created, needs PAT apt to have Colonoscopy scheduled. Called and gave number to call back and get scheduled.

## 2022-11-30 NOTE — TELEPHONE ENCOUNTER
----- Message from Talisha Raymond MA sent at 11/29/2022 12:09 PM CST -----  Good afternoon,    We received a referral from  for this patient to be seen for a colonoscopy. The referring diagnosis is colon cancer screening. I have scanned the referral and records into . Please review and contact the patient to schedule.    Thank you   [facility]  Ext [phone]

## 2022-12-15 ENCOUNTER — HOSPITAL ENCOUNTER (OUTPATIENT)
Dept: PREADMISSION TESTING | Facility: HOSPITAL | Age: 67
Discharge: HOME | End: 2022-12-15
Payer: MEDICARE

## 2022-12-15 DIAGNOSIS — Z12.11 COLON CANCER SCREENING: ICD-10-CM

## 2025-06-02 NOTE — TELEPHONE ENCOUNTER
Physical Therapy Initial Evaluation and Plan of Care  [facility] [address], [address]  [place] [address]     Patient: Darshan Frias   : 1970  Referring practitioner: Bessie Bergeron MD  Date of Initial Visit: 2025  Today's Date: 2025  Patient seen for 1 sessions  PT Clinic location: [address], [address] [place] [address].  [id number]          Visit Diagnoses:    ICD-10-CM ICD-9-CM   1. Adhesive capsulitis of right shoulder  M75.01 726.0   2. Chronic right shoulder pain  M25.511 719.41    G89.29 338.29   3. Decreased functional mobility  R26.89 781.99       Subjective Questionnaire: QuickDASH: 36.36    Subjective Evaluation    History of Present Illness  Mechanism of injury: The patient is a 55 y.o. male presenting to today's evaluation with R shoulder pain and stiffness. He has been having shoulder pain for a few years. He initially injured it about 20 years ago and has had intermittent pain since then. He started having more severe pain about 2 years ago. He could not reach behind his back at all and saw the MD and did PT. It came back in April of this year. He could not reach across his body and had pain down into his deltoid. He saw the MD who did an injection which has helped. He continues to have sharp pain when reaching out to the side and reaching behind his back. He also notes pain reaching overhead. Denies any numbness or tingling. Describes the pain as sharp. Denies any popping or clicking. He has not noticed any weakness in his arm. He is not taking any medications. He returns to the MD at the end of the month.       Aggravating factors: reaching  Alleviating factors: injection  Imaging: x-ray WNL       Occupation: desk work at [employer]  Prior level of function: independent without difficulty    Current Activity/Functional limitations:  He notes difficulty with reaching overhead, reaching behind his back, reaching  Pt calling regarding cellulitis on R leg. Has finished another round of antibiotics with no improvement. Per protocol, advised to see PCP within 3 days. States his PCP is unable to see him until next month. Warm transferred pt to scheduling desk [facility]    Reason for Disposition   [1] Taking antibiotic > 72 hours (3 days) AND [2] cellulitis symptoms are SAME (not getting better)    Additional Information   Negative: Shock suspected (e.g., cold/pale/clammy skin, too weak to stand, low BP, rapid pulse)   Negative: Sounds like a life-threatening emergency to the triager   Negative: SEVERE pain   Negative: [1] SEVERE pain with bending of finger (or toe) AND [2] cellulitis on hand (or foot)   Negative: Fever > 104 F (40 C)   Negative: [1] Widespread rash AND [2] bright red, sunburn-like   Negative: Black (necrotic), dark purple, or blisters develop in area of cellulitis   Negative: Patient sounds very sick or weak to the triager   Negative: [1] Taking antibiotic > 24 hours AND [2] fever persists   Negative: [1] Taking antibiotic > 24 hours AND [2] red streak (or line) runs from area of infection   Negative: [1] Fever > 100.0 F (37.8 C) AND [2] new-onset   Negative: [1] Red streak runs from area of infection AND [2] new-onset   Negative: [1] Caller has URGENT question AND [2] triager unable to answer question   Negative: [1] Taking antibiotic > 24 hours AND [2] cellulitis symptoms are WORSE (e.g., spreading redness, pain, swelling)   Negative: [1] Finished taking antibiotic AND [2] cellulitis symptoms are WORSE (e.g., redness, pain  drainage, swelling)   Negative: [1] Red streak (or line) runs from area of infection AND [2] longer than 4 inches (10 cm)   Negative: [1] Taking antibiotic < 24 hours AND [2] cellulitis symptoms are WORSE (e.g., spreading redness, pain, swelling, drainage) AND [3] no fever   Negative: [1] Caller has NON-URGENT question AND [2] triager unable to  answer question   Negative: Diabetes mellitus or weak immune system (e.g., HIV positive, cancer chemo, splenectomy, organ transplant, chronic steroids)    Protocols used: CELLULITIS ON ANTIBIOTIC FOLLOW-UP CALL-A-AH       out to the side, and sleeping. He sleeps on his side and often wakes up in pain. He does not think the pain is waking him up through. He also notes pain repositioning in bed. He does have difficulty lifting things overhead. Denies any difficulty with driving or doing his job duties. He states it is more painful if he moves quickly and takes about 15 sec to resolve.         Pain  Current pain ratin  At best pain ratin  At worst pain ratin    Patient Goals  Patient goals for therapy: decreased pain, increased motion, increased strength and independence with ADLs/IADLs         Medical history: shoulder pain, DVT. See chart for further detail.         Objective          Postural Observations    Additional Postural Observation Details  POSTURE: R shoulder elevation, pt is R handed, forward head posture, rounded shoulders, anterior GH translation;   PALPATION: tenderness in the anterior GH region    Active Range of Motion   Left Shoulder   Flexion: 150 degrees   Extension: 70 degrees   Abduction: 170 degrees   External rotation 0°: 70 degrees     Right Shoulder   Flexion: 130 (slight pain) degrees   Extension: 45 (pain) degrees   Abduction: 115 (pain* throughout ROM) degrees   External rotation 0°: 40 degrees     Additional Active Range of Motion Details  FUNCTIONAL IR BEHIND BACK   L: to T 12  R: to R lateral glutes (pain*)    BEHIND HEAD:  L: to T4  R: to T3    Strength/Myotome Testing     Left Shoulder     Planes of Motion   Flexion: 5   Abduction: 5   External rotation at 0°: 5   Internal rotation at 0°: 5     Right Shoulder     Planes of Motion   Flexion: 4   Abduction: 5   External rotation at 0°: 5   Internal rotation at 0°: 5           Assessment & Plan       Assessment  Impairments: abnormal or restricted ROM, impaired physical strength, lacks appropriate home exercise program, pain with function and weight-bearing intolerance   Functional limitations: carrying objects, lifting, pulling, pushing,  uncomfortable because of pain, reaching behind back, reaching overhead and unable to perform repetitive tasks   Assessment details: The patient is a 55 y.o. male who presents to physical therapy today for R shoulder pain and stiffness. Upon initial evaluation, the patient demonstrates the following impairments: decreased ROM in all planes, impaired postural alignment, tenderness with palpation, and weakness with shoulder flexion.     Due to these impairments, the patient is unable to perform or has difficulty with the following functional tasks: reaching in all directions, lifting overhead, and repositioning in bed. The patient would benefit from skilled PT services to address functional limitations and impairments and to improve patient quality of life.      Prognosis: good    Goals  Plan Goals: STG: (to be met in 4 weeks)  1. Pt will be independent and compliant with initial HEP without increasing baseline pain level.  2. Pt will self report a 25% improvement in symptoms since starting therapy.  3. Pt will report pain level at worst <7 in order to improve ability to complete ADLs and sleep.  4. Pt will demonstrate an increase in R flexion/abduction AROM with good mechanics to >150 degrees.    LTG: - by DC (12 weeks)  1. Pt will be independent with final HEP for self-management of condition.  2. Pt will improve score on QuickDASH to less than 25% in order to show improved functional use of UE.   3. Pt will report a 50% improvement in symptoms in order to allow return to PLOF.  4. Pt will improve R shoulder flexion/abduction to at least 165 deg in order to be able to reach into cabinet to get cup or plate and to wash hair & back without pain.  5. Pt will improve shoulder strength to at least 4+/5 in order to be able to lift at least 5# overhead so that they are able to put dishes and groceries away.       Plan  Therapy options: will be seen for skilled therapy services  Planned modality interventions: cryotherapy,  electrical stimulation/Russian stimulation, TENS, thermotherapy (hydrocollator packs) and ultrasound  Planned therapy interventions: body mechanics training, ADL retraining, flexibility, functional ROM exercises, home exercise program, joint mobilization, manual therapy, motor coordination training, neuromuscular re-education, postural training, soft tissue mobilization, spinal/joint mobilization, strengthening, stretching and therapeutic activities  Frequency: 2x week  Duration in weeks: 4  Treatment plan discussed with: patient      Access Code: [email or web address]  URL: [email or web address]  Date: 06/02/2025  Prepared by: Radha Carrera     See flowsheets for treatment detail.    Education on anatomy, pathology, home exercise program, and home management is provided.      History # of Personal Factors and/or Comorbidities: LOW (0)  Examination of Body System(s): # of elements: LOW (1-2)  Clinical Presentation: STABLE   Clinical Decision Making: LOW       Timed:         Manual Therapy:         mins  93497;     Therapeutic Exercise:    10     mins  56063;     Neuromuscular Wero:        mins  86260;    Therapeutic Activity:          mins  00014;     Gait Training:           mins  45648;     Ionto                                   mins   80334  Self Care                       10     mins   89922      Un-Timed:  Electrical Stimulation:         mins  88841 ( );  Dry Needling          mins self-pay  Traction          mins 52380  Low Eval     15     Mins  69900  Mod Eval          Mins  44118  High Eval                            Mins  70262  Re-Eval                               mins  06942      Timed Treatment:   20   mins   Total Treatment:     35   mins    PT SIGNATURE: Radha Carrera, PT   [place] PT license #: [id number]  DATE TREATMENT INITIATED: 6/2/2025    Initial Certification  Certification Period: 8/30/2025  I certify that the therapy services are furnished while this patient is under my care.  The  services outlined above are required by this patient, and will be reviewed every 90 days.    PHYSICIAN: Bessie Bergeron MD  NPI: [id number]                                      DATE:     Please sign and return via fax to [facility] - Fax #: [phone]. Thank you, [facility].